# Patient Record
Sex: MALE | Race: WHITE | NOT HISPANIC OR LATINO | Employment: FULL TIME | URBAN - METROPOLITAN AREA
[De-identification: names, ages, dates, MRNs, and addresses within clinical notes are randomized per-mention and may not be internally consistent; named-entity substitution may affect disease eponyms.]

---

## 2017-02-28 ENCOUNTER — APPOINTMENT (OUTPATIENT)
Dept: LAB | Facility: MEDICAL CENTER | Age: 32
End: 2017-02-28

## 2017-03-01 ENCOUNTER — HOSPITAL ENCOUNTER (OUTPATIENT)
Dept: RADIOLOGY | Facility: MEDICAL CENTER | Age: 32
DRG: 472 | End: 2017-03-01
Attending: NEUROLOGICAL SURGERY | Admitting: NEUROLOGICAL SURGERY
Payer: COMMERCIAL

## 2017-03-01 DIAGNOSIS — Z01.812 PRE-PROCEDURAL LABORATORY EXAMINATION: ICD-10-CM

## 2017-03-01 DIAGNOSIS — M50.30 DEGENERATION OF CERVICAL INTERVERTEBRAL DISC: ICD-10-CM

## 2017-03-01 DIAGNOSIS — Z01.810 PRE-OPERATIVE CARDIOVASCULAR EXAMINATION: ICD-10-CM

## 2017-03-01 LAB
ANION GAP SERPL CALC-SCNC: 8 MMOL/L (ref 0–11.9)
APPEARANCE UR: CLEAR
APTT PPP: 27.7 SEC (ref 24.7–36)
BACTERIA #/AREA URNS HPF: ABNORMAL /HPF
BASOPHILS # BLD AUTO: 0.4 % (ref 0–1.8)
BASOPHILS # BLD: 0.02 K/UL (ref 0–0.12)
BILIRUB UR QL STRIP.AUTO: NEGATIVE
BUN SERPL-MCNC: 12 MG/DL (ref 8–22)
CALCIUM SERPL-MCNC: 9.6 MG/DL (ref 8.5–10.5)
CAOX CRY #/AREA URNS HPF: ABNORMAL /HPF
CHLORIDE SERPL-SCNC: 105 MMOL/L (ref 96–112)
CO2 SERPL-SCNC: 28 MMOL/L (ref 20–33)
COLOR UR: YELLOW
CREAT SERPL-MCNC: 1.07 MG/DL (ref 0.5–1.4)
CULTURE IF INDICATED INDCX: NO UA CULTURE
EKG IMPRESSION: NORMAL
EOSINOPHIL # BLD AUTO: 0.06 K/UL (ref 0–0.51)
EOSINOPHIL NFR BLD: 1.2 % (ref 0–6.9)
ERYTHROCYTE [DISTWIDTH] IN BLOOD BY AUTOMATED COUNT: 40 FL (ref 35.9–50)
GFR SERPL CREATININE-BSD FRML MDRD: >60 ML/MIN/1.73 M 2
GLUCOSE SERPL-MCNC: 83 MG/DL (ref 65–99)
GLUCOSE UR STRIP.AUTO-MCNC: NEGATIVE MG/DL
HCT VFR BLD AUTO: 46.5 % (ref 42–52)
HGB BLD-MCNC: 15.4 G/DL (ref 14–18)
HYALINE CASTS #/AREA URNS LPF: ABNORMAL /LPF
IMM GRANULOCYTES # BLD AUTO: 0.05 K/UL (ref 0–0.11)
IMM GRANULOCYTES NFR BLD AUTO: 1 % (ref 0–0.9)
INR PPP: 0.92 (ref 0.87–1.13)
KETONES UR STRIP.AUTO-MCNC: ABNORMAL MG/DL
LEUKOCYTE ESTERASE UR QL STRIP.AUTO: NEGATIVE
LYMPHOCYTES # BLD AUTO: 1.99 K/UL (ref 1–4.8)
LYMPHOCYTES NFR BLD: 39.3 % (ref 22–41)
MCH RBC QN AUTO: 29.5 PG (ref 27–33)
MCHC RBC AUTO-ENTMCNC: 33.1 G/DL (ref 33.7–35.3)
MCV RBC AUTO: 89.1 FL (ref 81.4–97.8)
MICRO URNS: ABNORMAL
MONOCYTES # BLD AUTO: 0.47 K/UL (ref 0–0.85)
MONOCYTES NFR BLD AUTO: 9.3 % (ref 0–13.4)
MUCOUS THREADS #/AREA URNS HPF: ABNORMAL /HPF
NEUTROPHILS # BLD AUTO: 2.48 K/UL (ref 1.82–7.42)
NEUTROPHILS NFR BLD: 48.8 % (ref 44–72)
NITRITE UR QL STRIP.AUTO: NEGATIVE
NRBC # BLD AUTO: 0 K/UL
NRBC BLD AUTO-RTO: 0 /100 WBC
PH UR STRIP.AUTO: 6.5 [PH]
PLATELET # BLD AUTO: 163 K/UL (ref 164–446)
PMV BLD AUTO: 11.2 FL (ref 9–12.9)
POTASSIUM SERPL-SCNC: 3.8 MMOL/L (ref 3.6–5.5)
PROT UR QL STRIP: 30 MG/DL
PROTHROMBIN TIME: 12.6 SEC (ref 12–14.6)
RBC # BLD AUTO: 5.22 M/UL (ref 4.7–6.1)
RBC UR QL AUTO: NEGATIVE
SODIUM SERPL-SCNC: 141 MMOL/L (ref 135–145)
SP GR UR STRIP.AUTO: 1.03
WBC # BLD AUTO: 5.1 K/UL (ref 4.8–10.8)
WBC #/AREA URNS HPF: ABNORMAL /HPF

## 2017-03-01 PROCEDURE — 36415 COLL VENOUS BLD VENIPUNCTURE: CPT

## 2017-03-01 PROCEDURE — 71020 DX-CHEST-2 VIEWS: CPT

## 2017-03-01 PROCEDURE — 81001 URINALYSIS AUTO W/SCOPE: CPT

## 2017-03-01 PROCEDURE — 80048 BASIC METABOLIC PNL TOTAL CA: CPT

## 2017-03-01 PROCEDURE — 85025 COMPLETE CBC W/AUTO DIFF WBC: CPT

## 2017-03-01 PROCEDURE — 85730 THROMBOPLASTIN TIME PARTIAL: CPT

## 2017-03-01 PROCEDURE — 72050 X-RAY EXAM NECK SPINE 4/5VWS: CPT

## 2017-03-01 PROCEDURE — 85610 PROTHROMBIN TIME: CPT

## 2017-03-01 RX ORDER — LIDOCAINE 50 MG/G
1 PATCH TOPICAL
COMMUNITY

## 2017-03-01 RX ORDER — CYCLOBENZAPRINE HCL 10 MG
10 TABLET ORAL
Status: ON HOLD | COMMUNITY
End: 2017-03-08

## 2017-03-01 RX ORDER — PREGABALIN 50 MG/1
50 CAPSULE ORAL 2 TIMES DAILY
COMMUNITY
End: 2018-01-25

## 2017-03-07 ENCOUNTER — HOSPITAL ENCOUNTER (INPATIENT)
Facility: MEDICAL CENTER | Age: 32
LOS: 1 days | DRG: 472 | End: 2017-03-08
Attending: NEUROLOGICAL SURGERY | Admitting: NEUROLOGICAL SURGERY
Payer: COMMERCIAL

## 2017-03-07 ENCOUNTER — APPOINTMENT (OUTPATIENT)
Dept: RADIOLOGY | Facility: MEDICAL CENTER | Age: 32
DRG: 472 | End: 2017-03-07
Attending: NEUROLOGICAL SURGERY
Payer: COMMERCIAL

## 2017-03-07 PROBLEM — M50.30 DEGENERATION OF CERVICAL INTERVERTEBRAL DISC: Status: ACTIVE | Noted: 2017-03-07

## 2017-03-07 PROCEDURE — 700111 HCHG RX REV CODE 636 W/ 250 OVERRIDE (IP)

## 2017-03-07 PROCEDURE — A6402 STERILE GAUZE <= 16 SQ IN: HCPCS | Performed by: NEUROLOGICAL SURGERY

## 2017-03-07 PROCEDURE — 700101 HCHG RX REV CODE 250

## 2017-03-07 PROCEDURE — 770001 HCHG ROOM/CARE - MED/SURG/GYN PRIV*

## 2017-03-07 PROCEDURE — A9270 NON-COVERED ITEM OR SERVICE: HCPCS | Performed by: PHYSICIAN ASSISTANT

## 2017-03-07 PROCEDURE — 160048 HCHG OR STATISTICAL LEVEL 1-5: Performed by: NEUROLOGICAL SURGERY

## 2017-03-07 PROCEDURE — 500698 HCHG HEMOCLIP, MEDIUM: Performed by: NEUROLOGICAL SURGERY

## 2017-03-07 PROCEDURE — 160041 HCHG SURGERY MINUTES - EA ADDL 1 MIN LEVEL 4: Performed by: NEUROLOGICAL SURGERY

## 2017-03-07 PROCEDURE — A9270 NON-COVERED ITEM OR SERVICE: HCPCS

## 2017-03-07 PROCEDURE — 700101 HCHG RX REV CODE 250: Performed by: PHYSICIAN ASSISTANT

## 2017-03-07 PROCEDURE — 500122 HCHG BOVIE, BLADE: Performed by: NEUROLOGICAL SURGERY

## 2017-03-07 PROCEDURE — 110382 HCHG SHELL REV 271: Performed by: NEUROLOGICAL SURGERY

## 2017-03-07 PROCEDURE — 700102 HCHG RX REV CODE 250 W/ 637 OVERRIDE(OP): Performed by: PHYSICIAN ASSISTANT

## 2017-03-07 PROCEDURE — 0RG10A0 FUSION OF CERVICAL VERTEBRAL JOINT WITH INTERBODY FUSION DEVICE, ANTERIOR APPROACH, ANTERIOR COLUMN, OPEN APPROACH: ICD-10-PCS | Performed by: NEUROLOGICAL SURGERY

## 2017-03-07 PROCEDURE — 500864 HCHG NEEDLE, SPINAL 18G: Performed by: NEUROLOGICAL SURGERY

## 2017-03-07 PROCEDURE — 700102 HCHG RX REV CODE 250 W/ 637 OVERRIDE(OP)

## 2017-03-07 PROCEDURE — A4606 OXYGEN PROBE USED W OXIMETER: HCPCS | Performed by: NEUROLOGICAL SURGERY

## 2017-03-07 PROCEDURE — 500700 HCHG HEMOCLIP, SMALL (RED): Performed by: NEUROLOGICAL SURGERY

## 2017-03-07 PROCEDURE — 502626 HCHG SURGIFLO HEMOSTATIC MATRIX 6ML: Performed by: NEUROLOGICAL SURGERY

## 2017-03-07 PROCEDURE — C1713 ANCHOR/SCREW BN/BN,TIS/BN: HCPCS | Performed by: NEUROLOGICAL SURGERY

## 2017-03-07 PROCEDURE — 0RT30ZZ RESECTION OF CERVICAL VERTEBRAL DISC, OPEN APPROACH: ICD-10-PCS | Performed by: NEUROLOGICAL SURGERY

## 2017-03-07 PROCEDURE — 160035 HCHG PACU - 1ST 60 MINS PHASE I: Performed by: NEUROLOGICAL SURGERY

## 2017-03-07 PROCEDURE — 501838 HCHG SUTURE GENERAL: Performed by: NEUROLOGICAL SURGERY

## 2017-03-07 PROCEDURE — 160009 HCHG ANES TIME/MIN: Performed by: NEUROLOGICAL SURGERY

## 2017-03-07 PROCEDURE — 502240 HCHG MISC OR SUPPLY RC 0272: Performed by: NEUROLOGICAL SURGERY

## 2017-03-07 PROCEDURE — 502000 HCHG MISC OR IMPLANTS RC 0278: Performed by: NEUROLOGICAL SURGERY

## 2017-03-07 PROCEDURE — 700111 HCHG RX REV CODE 636 W/ 250 OVERRIDE (IP): Performed by: PHYSICIAN ASSISTANT

## 2017-03-07 PROCEDURE — 72040 X-RAY EXAM NECK SPINE 2-3 VW: CPT

## 2017-03-07 PROCEDURE — 700112 HCHG RX REV CODE 229: Performed by: PHYSICIAN ASSISTANT

## 2017-03-07 PROCEDURE — 4A11X4G MONITORING OF PERIPHERAL NERVOUS ELECTRICAL ACTIVITY, INTRAOPERATIVE, EXTERNAL APPROACH: ICD-10-PCS | Performed by: NEUROLOGICAL SURGERY

## 2017-03-07 PROCEDURE — 160029 HCHG SURGERY MINUTES - 1ST 30 MINS LEVEL 4: Performed by: NEUROLOGICAL SURGERY

## 2017-03-07 PROCEDURE — 160002 HCHG RECOVERY MINUTES (STAT): Performed by: NEUROLOGICAL SURGERY

## 2017-03-07 PROCEDURE — 500364 HCHG DISSECT TOOL, MIDAS: Performed by: NEUROLOGICAL SURGERY

## 2017-03-07 PROCEDURE — 160036 HCHG PACU - EA ADDL 30 MINS PHASE I: Performed by: NEUROLOGICAL SURGERY

## 2017-03-07 DEVICE — GRAFT ACTIFUSE ABX PUTTY 1.5ML: Type: IMPLANTABLE DEVICE | Status: FUNCTIONAL

## 2017-03-07 RX ORDER — AMOXICILLIN 250 MG
1 CAPSULE ORAL NIGHTLY
Status: DISCONTINUED | OUTPATIENT
Start: 2017-03-07 | End: 2017-03-08 | Stop reason: HOSPADM

## 2017-03-07 RX ORDER — PROMETHAZINE HYDROCHLORIDE 25 MG/1
12.5-25 TABLET ORAL EVERY 4 HOURS PRN
Status: DISCONTINUED | OUTPATIENT
Start: 2017-03-07 | End: 2017-03-08 | Stop reason: HOSPADM

## 2017-03-07 RX ORDER — DIVALPROEX SODIUM 500 MG/1
1000 TABLET, DELAYED RELEASE ORAL
Status: DISCONTINUED | OUTPATIENT
Start: 2017-03-07 | End: 2017-03-08 | Stop reason: HOSPADM

## 2017-03-07 RX ORDER — DEXTROSE MONOHYDRATE, SODIUM CHLORIDE, AND POTASSIUM CHLORIDE 50; 1.49; 4.5 G/1000ML; G/1000ML; G/1000ML
INJECTION, SOLUTION INTRAVENOUS CONTINUOUS
Status: DISCONTINUED | OUTPATIENT
Start: 2017-03-07 | End: 2017-03-08 | Stop reason: HOSPADM

## 2017-03-07 RX ORDER — OXYCODONE HYDROCHLORIDE 10 MG/1
10 TABLET ORAL EVERY 4 HOURS PRN
Status: DISCONTINUED | OUTPATIENT
Start: 2017-03-07 | End: 2017-03-08 | Stop reason: HOSPADM

## 2017-03-07 RX ORDER — DIVALPROEX SODIUM 500 MG/1
1000 TABLET, DELAYED RELEASE ORAL
COMMUNITY

## 2017-03-07 RX ORDER — DIPHENHYDRAMINE HCL 25 MG
25 TABLET ORAL EVERY 6 HOURS PRN
Status: DISCONTINUED | OUTPATIENT
Start: 2017-03-07 | End: 2017-03-08 | Stop reason: HOSPADM

## 2017-03-07 RX ORDER — OXYCODONE HYDROCHLORIDE 5 MG/1
10-20 TABLET ORAL EVERY 4 HOURS PRN
Status: DISCONTINUED | OUTPATIENT
Start: 2017-03-07 | End: 2017-03-07

## 2017-03-07 RX ORDER — ALPRAZOLAM 0.25 MG/1
0.25 TABLET ORAL 2 TIMES DAILY PRN
Status: DISCONTINUED | OUTPATIENT
Start: 2017-03-07 | End: 2017-03-08 | Stop reason: HOSPADM

## 2017-03-07 RX ORDER — LIDOCAINE HYDROCHLORIDE 10 MG/ML
INJECTION, SOLUTION INFILTRATION; PERINEURAL
Status: COMPLETED
Start: 2017-03-07 | End: 2017-03-07

## 2017-03-07 RX ORDER — SODIUM CHLORIDE, SODIUM LACTATE, POTASSIUM CHLORIDE, AND CALCIUM CHLORIDE .6; .31; .03; .02 G/100ML; G/100ML; G/100ML; G/100ML
IRRIGANT IRRIGATION
Status: DISCONTINUED | OUTPATIENT
Start: 2017-03-07 | End: 2017-03-07 | Stop reason: HOSPADM

## 2017-03-07 RX ORDER — ACETAMINOPHEN 500 MG
TABLET ORAL
Status: COMPLETED
Start: 2017-03-07 | End: 2017-03-07

## 2017-03-07 RX ORDER — ALBUTEROL SULFATE 90 UG/1
2 AEROSOL, METERED RESPIRATORY (INHALATION) EVERY 4 HOURS PRN
Status: DISCONTINUED | OUTPATIENT
Start: 2017-03-07 | End: 2017-03-08 | Stop reason: HOSPADM

## 2017-03-07 RX ORDER — METHYLPREDNISOLONE ACETATE 80 MG/ML
80 INJECTION, SUSPENSION INTRA-ARTICULAR; INTRALESIONAL; INTRAMUSCULAR; SOFT TISSUE ONCE
Status: COMPLETED | OUTPATIENT
Start: 2017-03-07 | End: 2017-03-07

## 2017-03-07 RX ORDER — OXYCODONE HYDROCHLORIDE 5 MG/1
5-10 TABLET ORAL EVERY 4 HOURS PRN
Status: DISCONTINUED | OUTPATIENT
Start: 2017-03-07 | End: 2017-03-07

## 2017-03-07 RX ORDER — MORPHINE SULFATE 4 MG/ML
2-4 INJECTION, SOLUTION INTRAMUSCULAR; INTRAVENOUS
Status: DISCONTINUED | OUTPATIENT
Start: 2017-03-07 | End: 2017-03-08 | Stop reason: HOSPADM

## 2017-03-07 RX ORDER — OXYCODONE HCL 5 MG/5 ML
SOLUTION, ORAL ORAL
Status: COMPLETED
Start: 2017-03-07 | End: 2017-03-07

## 2017-03-07 RX ORDER — PREGABALIN 25 MG/1
50 CAPSULE ORAL 2 TIMES DAILY
Status: DISCONTINUED | OUTPATIENT
Start: 2017-03-07 | End: 2017-03-08 | Stop reason: HOSPADM

## 2017-03-07 RX ORDER — CEPHALEXIN 500 MG/1
500 CAPSULE ORAL EVERY 6 HOURS
Status: DISCONTINUED | OUTPATIENT
Start: 2017-03-08 | End: 2017-03-08 | Stop reason: HOSPADM

## 2017-03-07 RX ORDER — ONDANSETRON 4 MG/1
4 TABLET, ORALLY DISINTEGRATING ORAL EVERY 4 HOURS PRN
Status: DISCONTINUED | OUTPATIENT
Start: 2017-03-07 | End: 2017-03-08 | Stop reason: HOSPADM

## 2017-03-07 RX ORDER — GABAPENTIN 300 MG/1
CAPSULE ORAL
Status: COMPLETED
Start: 2017-03-07 | End: 2017-03-07

## 2017-03-07 RX ORDER — DOCUSATE SODIUM 100 MG/1
100 CAPSULE, LIQUID FILLED ORAL 2 TIMES DAILY
Status: DISCONTINUED | OUTPATIENT
Start: 2017-03-07 | End: 2017-03-08 | Stop reason: HOSPADM

## 2017-03-07 RX ORDER — OXYCODONE HCL 20 MG/1
TABLET, FILM COATED, EXTENDED RELEASE ORAL
Status: COMPLETED
Start: 2017-03-07 | End: 2017-03-07

## 2017-03-07 RX ORDER — OXYCODONE HYDROCHLORIDE 10 MG/1
20 TABLET ORAL EVERY 4 HOURS PRN
Status: DISCONTINUED | OUTPATIENT
Start: 2017-03-07 | End: 2017-03-08 | Stop reason: HOSPADM

## 2017-03-07 RX ORDER — LABETALOL HYDROCHLORIDE 5 MG/ML
10 INJECTION, SOLUTION INTRAVENOUS
Status: DISCONTINUED | OUTPATIENT
Start: 2017-03-07 | End: 2017-03-08 | Stop reason: HOSPADM

## 2017-03-07 RX ORDER — CEFAZOLIN SODIUM 2 G/100ML
2 INJECTION, SOLUTION INTRAVENOUS EVERY 8 HOURS
Status: COMPLETED | OUTPATIENT
Start: 2017-03-07 | End: 2017-03-08

## 2017-03-07 RX ORDER — AMOXICILLIN 250 MG
1 CAPSULE ORAL
Status: DISCONTINUED | OUTPATIENT
Start: 2017-03-07 | End: 2017-03-08 | Stop reason: HOSPADM

## 2017-03-07 RX ORDER — DIPHENHYDRAMINE HYDROCHLORIDE 50 MG/ML
25 INJECTION INTRAMUSCULAR; INTRAVENOUS EVERY 6 HOURS PRN
Status: DISCONTINUED | OUTPATIENT
Start: 2017-03-07 | End: 2017-03-08 | Stop reason: HOSPADM

## 2017-03-07 RX ORDER — SODIUM CHLORIDE, SODIUM LACTATE, POTASSIUM CHLORIDE, CALCIUM CHLORIDE 600; 310; 30; 20 MG/100ML; MG/100ML; MG/100ML; MG/100ML
1000 INJECTION, SOLUTION INTRAVENOUS
Status: COMPLETED | OUTPATIENT
Start: 2017-03-07 | End: 2017-03-07

## 2017-03-07 RX ORDER — BUPIVACAINE HYDROCHLORIDE AND EPINEPHRINE 5; 5 MG/ML; UG/ML
INJECTION, SOLUTION EPIDURAL; INTRACAUDAL; PERINEURAL
Status: DISCONTINUED | OUTPATIENT
Start: 2017-03-07 | End: 2017-03-07 | Stop reason: HOSPADM

## 2017-03-07 RX ORDER — HYDRALAZINE HYDROCHLORIDE 20 MG/ML
10 INJECTION INTRAMUSCULAR; INTRAVENOUS
Status: DISCONTINUED | OUTPATIENT
Start: 2017-03-07 | End: 2017-03-08 | Stop reason: HOSPADM

## 2017-03-07 RX ORDER — ONDANSETRON 2 MG/ML
4 INJECTION INTRAMUSCULAR; INTRAVENOUS EVERY 4 HOURS PRN
Status: DISCONTINUED | OUTPATIENT
Start: 2017-03-07 | End: 2017-03-08 | Stop reason: HOSPADM

## 2017-03-07 RX ORDER — BISACODYL 10 MG
10 SUPPOSITORY, RECTAL RECTAL
Status: DISCONTINUED | OUTPATIENT
Start: 2017-03-07 | End: 2017-03-08 | Stop reason: HOSPADM

## 2017-03-07 RX ORDER — POLYETHYLENE GLYCOL 3350 17 G/17G
1 POWDER, FOR SOLUTION ORAL 2 TIMES DAILY PRN
Status: DISCONTINUED | OUTPATIENT
Start: 2017-03-07 | End: 2017-03-08 | Stop reason: HOSPADM

## 2017-03-07 RX ORDER — ACETAMINOPHEN 500 MG
500 TABLET ORAL EVERY 6 HOURS
Status: DISCONTINUED | OUTPATIENT
Start: 2017-03-08 | End: 2017-03-08 | Stop reason: HOSPADM

## 2017-03-07 RX ORDER — PROMETHAZINE HYDROCHLORIDE 25 MG/1
12.5-25 SUPPOSITORY RECTAL EVERY 4 HOURS PRN
Status: DISCONTINUED | OUTPATIENT
Start: 2017-03-07 | End: 2017-03-08 | Stop reason: HOSPADM

## 2017-03-07 RX ORDER — ENEMA 19; 7 G/133ML; G/133ML
1 ENEMA RECTAL
Status: DISCONTINUED | OUTPATIENT
Start: 2017-03-07 | End: 2017-03-08 | Stop reason: HOSPADM

## 2017-03-07 RX ORDER — OXYCODONE HYDROCHLORIDE 5 MG/1
5 TABLET ORAL EVERY 4 HOURS PRN
Status: DISCONTINUED | OUTPATIENT
Start: 2017-03-07 | End: 2017-03-08 | Stop reason: HOSPADM

## 2017-03-07 RX ORDER — OXYCODONE HYDROCHLORIDE 5 MG/1
10 TABLET ORAL EVERY 6 HOURS PRN
Status: ON HOLD | COMMUNITY
End: 2017-03-08

## 2017-03-07 RX ORDER — ALPRAZOLAM 0.25 MG/1
TABLET ORAL
Status: DISPENSED
Start: 2017-03-07 | End: 2017-03-08

## 2017-03-07 RX ORDER — CALCIUM CARBONATE 500 MG/1
500 TABLET, CHEWABLE ORAL 2 TIMES DAILY
Status: DISCONTINUED | OUTPATIENT
Start: 2017-03-07 | End: 2017-03-08 | Stop reason: HOSPADM

## 2017-03-07 RX ORDER — ALBUTEROL SULFATE 2.5 MG/3ML
SOLUTION RESPIRATORY (INHALATION)
Status: COMPLETED
Start: 2017-03-07 | End: 2017-03-07

## 2017-03-07 RX ORDER — CYCLOBENZAPRINE HCL 10 MG
10 TABLET ORAL EVERY 8 HOURS PRN
Status: DISCONTINUED | OUTPATIENT
Start: 2017-03-07 | End: 2017-03-08 | Stop reason: HOSPADM

## 2017-03-07 RX ADMIN — DIVALPROEX SODIUM 1000 MG: 500 TABLET, DELAYED RELEASE ORAL at 22:47

## 2017-03-07 RX ADMIN — HYDROMORPHONE HYDROCHLORIDE 0.5 MG: 1 INJECTION, SOLUTION INTRAMUSCULAR; INTRAVENOUS; SUBCUTANEOUS at 17:20

## 2017-03-07 RX ADMIN — OXYCODONE HYDROCHLORIDE 20 MG: 20 TABLET, FILM COATED, EXTENDED RELEASE ORAL at 14:10

## 2017-03-07 RX ADMIN — HYDROMORPHONE HYDROCHLORIDE 0.5 MG: 1 INJECTION, SOLUTION INTRAMUSCULAR; INTRAVENOUS; SUBCUTANEOUS at 20:50

## 2017-03-07 RX ADMIN — FENTANYL CITRATE 50 MCG: 50 INJECTION, SOLUTION INTRAMUSCULAR; INTRAVENOUS at 17:10

## 2017-03-07 RX ADMIN — LIDOCAINE HYDROCHLORIDE: 10 INJECTION, SOLUTION INFILTRATION; PERINEURAL at 13:40

## 2017-03-07 RX ADMIN — CYCLOBENZAPRINE HYDROCHLORIDE 10 MG: 10 TABLET, FILM COATED ORAL at 23:37

## 2017-03-07 RX ADMIN — ALPRAZOLAM 0.25 MG: 0.25 TABLET ORAL at 17:40

## 2017-03-07 RX ADMIN — HYDROMORPHONE HYDROCHLORIDE 0.5 MG: 1 INJECTION, SOLUTION INTRAMUSCULAR; INTRAVENOUS; SUBCUTANEOUS at 17:30

## 2017-03-07 RX ADMIN — METHYLPREDNISOLONE ACETATE 80 MG: 80 INJECTION, SUSPENSION INTRA-ARTICULAR; INTRALESIONAL; INTRAMUSCULAR; SOFT TISSUE at 23:17

## 2017-03-07 RX ADMIN — OXYCODONE HYDROCHLORIDE 20 MG: 10 TABLET ORAL at 22:48

## 2017-03-07 RX ADMIN — HYDROMORPHONE HYDROCHLORIDE 0.5 MG: 1 INJECTION, SOLUTION INTRAMUSCULAR; INTRAVENOUS; SUBCUTANEOUS at 19:15

## 2017-03-07 RX ADMIN — PREGABALIN 50 MG: 25 CAPSULE ORAL at 22:48

## 2017-03-07 RX ADMIN — DOCUSATE SODIUM 100 MG: 100 CAPSULE ORAL at 22:47

## 2017-03-07 RX ADMIN — POTASSIUM CHLORIDE, DEXTROSE MONOHYDRATE AND SODIUM CHLORIDE: 150; 5; 450 INJECTION, SOLUTION INTRAVENOUS at 23:16

## 2017-03-07 RX ADMIN — HYDROMORPHONE HYDROCHLORIDE 0.5 MG: 1 INJECTION, SOLUTION INTRAMUSCULAR; INTRAVENOUS; SUBCUTANEOUS at 17:50

## 2017-03-07 RX ADMIN — STANDARDIZED SENNA CONCENTRATE AND DOCUSATE SODIUM 1 TABLET: 8.6; 5 TABLET, FILM COATED ORAL at 22:47

## 2017-03-07 RX ADMIN — HYDROMORPHONE HYDROCHLORIDE 0.5 MG: 1 INJECTION, SOLUTION INTRAMUSCULAR; INTRAVENOUS; SUBCUTANEOUS at 18:45

## 2017-03-07 RX ADMIN — CEFAZOLIN SODIUM 2 G: 2 INJECTION, SOLUTION INTRAVENOUS at 23:16

## 2017-03-07 RX ADMIN — GABAPENTIN 600 MG: 300 CAPSULE ORAL at 14:10

## 2017-03-07 RX ADMIN — ALBUTEROL SULFATE 2.5 MG: 2.5 SOLUTION RESPIRATORY (INHALATION) at 18:25

## 2017-03-07 RX ADMIN — SODIUM CHLORIDE, SODIUM LACTATE, POTASSIUM CHLORIDE, CALCIUM CHLORIDE 1000 ML: 600; 310; 30; 20 INJECTION, SOLUTION INTRAVENOUS at 13:50

## 2017-03-07 RX ADMIN — HYDROMORPHONE HYDROCHLORIDE 0.5 MG: 1 INJECTION, SOLUTION INTRAMUSCULAR; INTRAVENOUS; SUBCUTANEOUS at 20:35

## 2017-03-07 RX ADMIN — HYDROMORPHONE HYDROCHLORIDE 0.5 MG: 1 INJECTION, SOLUTION INTRAMUSCULAR; INTRAVENOUS; SUBCUTANEOUS at 17:40

## 2017-03-07 RX ADMIN — OXYCODONE HYDROCHLORIDE 10 MG: 5 SOLUTION ORAL at 16:35

## 2017-03-07 RX ADMIN — FENTANYL CITRATE 50 MCG: 50 INJECTION, SOLUTION INTRAMUSCULAR; INTRAVENOUS at 17:02

## 2017-03-07 RX ADMIN — ANTACID TABLETS 500 MG: 500 TABLET, CHEWABLE ORAL at 22:48

## 2017-03-07 RX ADMIN — ACETAMINOPHEN 1000 MG: 500 TABLET, FILM COATED ORAL at 14:10

## 2017-03-07 ASSESSMENT — LIFESTYLE VARIABLES
TOTAL SCORE: 0
AVERAGE NUMBER OF DAYS PER WEEK YOU HAVE A DRINK CONTAINING ALCOHOL: 1
EVER FELT BAD OR GUILTY ABOUT YOUR DRINKING: NO
HAVE YOU EVER FELT YOU SHOULD CUT DOWN ON YOUR DRINKING: NO
HOW MANY TIMES IN THE PAST YEAR HAVE YOU HAD 5 OR MORE DRINKS IN A DAY: 0
TOTAL SCORE: 0
EVER HAD A DRINK FIRST THING IN THE MORNING TO STEADY YOUR NERVES TO GET RID OF A HANGOVER: NO
TOTAL SCORE: 0
HAVE PEOPLE ANNOYED YOU BY CRITICIZING YOUR DRINKING: NO
EVER_SMOKED: YES
CONSUMPTION TOTAL: NEGATIVE
ALCOHOL_USE: YES
ON A TYPICAL DAY WHEN YOU DRINK ALCOHOL HOW MANY DRINKS DO YOU HAVE: 0

## 2017-03-07 ASSESSMENT — PAIN SCALES - GENERAL
PAINLEVEL_OUTOF10: 0
PAINLEVEL_OUTOF10: 8
PAINLEVEL_OUTOF10: 6
PAINLEVEL_OUTOF10: 0
PAINLEVEL_OUTOF10: 6
PAINLEVEL_OUTOF10: 7
PAINLEVEL_OUTOF10: 7
PAINLEVEL_OUTOF10: 6
PAINLEVEL_OUTOF10: 7
PAINLEVEL_OUTOF10: 6
PAINLEVEL_OUTOF10: 6
PAINLEVEL_OUTOF10: 8
PAINLEVEL_OUTOF10: 8
PAINLEVEL_OUTOF10: 6
PAINLEVEL_OUTOF10: 6
PAINLEVEL_OUTOF10: 0
PAINLEVEL_OUTOF10: 6

## 2017-03-07 NOTE — PROGRESS NOTES
Med rec partially complete [facility] pt and [facility]  [phone]  Allergies reviewed  [facility] verified Divalproex though Pt only takes 2 tabs at night(1000 mg ) instead of 4 tabs(2000 mg).

## 2017-03-07 NOTE — IP AVS SNAPSHOT
3/8/2017          Danie Pretty  [address] [facility] [address]    Dear Danie:    [facility] wants to ensure your discharge home is safe and you or your loved ones have had all your questions answered regarding your care after you leave the hospital.    You may receive a telephone call within two days of your discharge.  This call is to make certain you understand your discharge instructions as well as ensure we provided you with the best care possible during your stay with us.     The call will only last approximately 3-5 minutes and will be done by a nurse.    Once again, we want to ensure your discharge home is safe and that you have a clear understanding of any next steps in your care.  If you have any questions or concerns, please do not hesitate to contact us, we are here for you.  Thank you for choosing [facility] for your healthcare needs.    Sincerely,    Josr Lawson    [facility]

## 2017-03-07 NOTE — IP AVS SNAPSHOT
" <p align=\"LEFT\"><IMG SRC=\"[email or web address]\" alt=\"Image\" WIDTH=\"50%\" HEIGHT=\"200\" BORDER=\"\"></p>                   Name:Danie Pretty  Medical Record Number:[id number]  CSN: [id number]    YOB: 1985   Age: 32 y.o.  Sex: male  HT:1.905 m (6' 3\") WT: 119.7 kg (263 lb 14.3 oz)          Admit Date: 3/7/2017     Discharge Date:   Today's Date: 3/8/2017  Attending Doctor:  Timi Cook M.D.                  Allergies:  Review of patient's allergies indicates no known allergies.             Medication List      Take these Medications        Instructions    cephALEXin 500 MG Caps   Commonly known as:  KEFLEX    Take 1 Cap by mouth every 6 hours for 5 days.   Dose:  500 mg       cyclobenzaprine 10 MG Tabs   Commonly known as:  FLEXERIL    Take 1 Tab by mouth every 8 hours as needed for Muscle Spasms.   Dose:  10 mg       divalproex 500 MG Tbec   Commonly known as:  DEPAKOTE    Take 1,000 mg by mouth every bedtime.   Dose:  1000 mg       lidocaine 5 % Ptch   Commonly known as:  LIDODERM    Apply 1 Patch to skin as directed 1 time daily as needed.   Dose:  1 Patch       LYRICA 50 MG capsule   Generic drug:  pregabalin    Take 50 mg by mouth 2 times a day.   Dose:  50 mg       oxycodone immediate release 10 MG immediate release tablet   What changed:    - medication strength  - how much to take  - when to take this  - reasons to take this   Commonly known as:  ROXICODONE    Take 1-2 Tabs by mouth every four hours as needed for Severe Pain (NRS pain scale 7-10, CPOT pain scale 6-8).   Dose:  10-20 mg         "

## 2017-03-07 NOTE — IP AVS SNAPSHOT
All Access Telecom Access Code: Activation code not generated  Current All Access Telecom Status: Patient Declined    [email or web address]  A secure, online tool to manage your health information     [employer]’s All Access Telecom® is a secure, online tool that connects you to your personalized health information from the privacy of your home -- day or night - making it very easy for you to manage your healthcare. Once the activation process is completed, you can even access your medical information using the All Access Telecom dao, which is available for free in the Apple Dao store or Google Play store.     All Access Telecom provides the following levels of access (as shown below):   My Chart Features   [facility] Primary Care Doctor [facility]  Specialists [facility]  Urgent  Care Non-[facility]  Primary Care  Doctor   Email your healthcare team securely and privately 24/7 X X X X   Manage appointments: schedule your next appointment; view details of past/upcoming appointments X      Request prescription refills. X      View recent personal medical records, including lab and immunizations X X X X   View health record, including health history, allergies, medications X X X X   Read reports about your outpatient visits, procedures, consult and ER notes X X X X   See your discharge summary, which is a recap of your hospital and/or ER visit that includes your diagnosis, lab results, and care plan. X X       How to register for All Access Telecom:  1. Go to  [email or web address].  2. Click on the Sign Up Now box, which takes you to the New Member Sign Up page. You will need to provide the following information:  a. Enter your All Access Telecom Access Code exactly as it appears at the top of this page. (You will not need to use this code after you’ve completed the sign-up process. If you do not sign up before the expiration date, you must request a new code.)   b. Enter your date of birth.   c. Enter your home email address.   d. Click Submit, and follow the next screen’s instructions.  3. Create a All Access Telecom ID.  This will be your [employer] login ID and cannot be changed, so think of one that is secure and easy to remember.  4. Create a [employer] password. You can change your password at any time.  5. Enter your Password Reset Question and Answer. This can be used at a later time if you forget your password.   6. Enter your e-mail address. This allows you to receive e-mail notifications when new information is available in [employer].  7. Click Sign Up. You can now view your health information.    For assistance activating your [employer] account, call [phone]

## 2017-03-07 NOTE — IP AVS SNAPSHOT
" Home Care Instructions                                                                                                                  Name:Danie Pretty  Medical Record Number:[id number]  CSN: [id number]    YOB: 1985   Age: 32 y.o.  Sex: male  HT:1.905 m (6' 3\") WT: 119.7 kg (263 lb 14.3 oz)          Admit Date: 3/7/2017     Discharge Date:   Today's Date: 3/8/2017  Attending Doctor:  Timi Cook M.D.                  Allergies:  Review of patient's allergies indicates no known allergies.            Discharge Instructions       Discharge Instructions    Discharged to home by car with relative. Discharged via wheelchair, hospital escort: Yes.  Special equipment needed: Not Applicable    Be sure to schedule a follow-up appointment with your primary care doctor or any specialists as instructed.     Discharge Plan:   Influenza Vaccine Indication: Not indicated: Previously immunized this influenza season and > 8 years of age    I understand that a diet low in cholesterol, fat, and sodium is recommended for good health. Unless I have been given specific instructions below for another diet, I accept this instruction as my diet prescription.   Other diet: Regular    Special Instructions: NO repetitive arms above shoulder level, NO pushing, pulling or lifting greater than 15 lbs , shower ok 24 hours after drain removed, pat incision / steri strips dry, no dressing unless drainage, NO non-steroidal anti-inflammatory meds until cleared by MD (for example Motrin, Ibuprofen, Celebrex), call office for incision redness, drainage, chills or increased temperature, ambulate as much as it is comfortable for you and NO driving for at least 2 weeks following surgery.    · Is patient discharged on Warfarin / Coumadin?   No     · Is patient Post Blood Transfusion?  No    Depression / Suicide Risk    As you are discharged from this [facility] facility, it is important to learn how to keep safe from harming " yourself.    Recognize the warning signs:  · Abrupt changes in personality, positive or negative- including increase in energy   · Giving away possessions  · Change in eating patterns- significant weight changes-  positive or negative  · Change in sleeping patterns- unable to sleep or sleeping all the time   · Unwillingness or inability to communicate  · Depression  · Unusual sadness, discouragement and loneliness  · Talk of wanting to die  · Neglect of personal appearance   · Rebelliousness- reckless behavior  · Withdrawal from people/activities they love  · Confusion- inability to concentrate     If you or a loved one observes any of these behaviors or has concerns about self-harm, here's what you can do:  · Talk about it- your feelings and reasons for harming yourself  · Remove any means that you might use to hurt yourself (examples: pills, rope, extension cords, firearm)  · Get professional help from the community (Mental Health, Substance Abuse, psychological counseling)  · Do not be alone:Call your Safe Contact- someone whom you trust who will be there for you.  · Call your local CRISIS HOTLINE 722-5174 or 500-061-2464  · Call your local Children's Mobile Crisis Response Team [place] (305) 256-6520 or www.ResolutionTube  · Call the toll free National Suicide Prevention Hotlines   · National Suicide Prevention Lifeline 881-041-ARHP (8642)  · National Hope Line Network 800-SUICIDE (852-0172)           Discharge Medication Instructions:    Below are the medications your physician expects you to take upon discharge:    Review all your home medications and newly ordered medications with your doctor and/or pharmacist. Follow medication instructions as directed by your doctor and/or pharmacist.    Please keep your medication list with you and share with your physician.               Medication List      START taking these medications        Instructions    cephALEXin 500 MG Caps   Commonly known as:  KEFLEX     Take 1 Cap by mouth every 6 hours for 5 days.   Dose:  500 mg       cyclobenzaprine 10 MG Tabs   Last time this was given:  10 mg on 3/7/2017 11:37 PM   Commonly known as:  FLEXERIL    Take 1 Tab by mouth every 8 hours as needed for Muscle Spasms.   Dose:  10 mg         CHANGE how you take these medications        Instructions    oxycodone immediate release 10 MG immediate release tablet   What changed:    - medication strength  - how much to take  - when to take this  - reasons to take this   Last time this was given:  20 mg on 3/8/2017  7:33 AM   Commonly known as:  ROXICODONE    Take 1-2 Tabs by mouth every four hours as needed for Severe Pain (NRS pain scale 7-10, CPOT pain scale 6-8).   Dose:  10-20 mg         CONTINUE taking these medications        Instructions    divalproex 500 MG Tbec   Last time this was given:  1,000 mg on 3/7/2017 10:47 PM   Commonly known as:  DEPAKOTE    Take 1,000 mg by mouth every bedtime.   Dose:  1000 mg       lidocaine 5 % Ptch   Commonly known as:  LIDODERM    Apply 1 Patch to skin as directed 1 time daily as needed.   Dose:  1 Patch       LYRICA 50 MG capsule   Last time this was given:  50 mg on 3/8/2017  7:33 AM   Generic drug:  pregabalin    Take 50 mg by mouth 2 times a day.   Dose:  50 mg               Instructions           Diet / Nutrition:    Follow any diet instructions given to you by your doctor or the dietician, including how much salt (sodium) you are allowed each day.    If you are overweight, talk to your doctor about a weight reduction plan.    Activity:    Remain physically active following your doctor's instructions about exercise and activity.    Rest often.     Any time you become even a little tired or short of breath, SIT DOWN and rest.    Worsening Symptoms:    Report any of the following signs and symptoms to the doctor's office immediately:    *Pain of jaw, arm, or neck  *Chest pain not relieved by medication                               *Dizziness  or loss of consciousness  *Difficulty breathing even when at rest   *More tired than usual                                       *Bleeding drainage or swelling of surgical site  *Swelling of feet, ankles, legs or stomach                 *Fever (>100ºF)  *Pink or blood tinged sputum  *Weight gain (3lbs/day or 5lbs /week)           *Shock from internal defibrillator (if applicable)  *Palpitations or irregular heartbeats                *Cool and/or numb extremities    Stroke Awareness    Common Risk Factors for Stroke include:    Age  Atrial Fibrillation  Carotid Artery Stenosis  Diabetes Mellitus  Excessive alcohol consumption  High blood pressure  Overweight   Physical inactivity  Smoking    Warning signs and symptoms of a stroke include:    *Sudden numbness or weakness of the face, arm or leg (especially on one side of the body).  *Sudden confusion, trouble speaking or understanding.  *Sudden trouble seeing in one or both eyes.  *Sudden trouble walking, dizziness, loss of balance or coordination.Sudden severe headache with no known cause.    It is very important to get treatment quickly when a stroke occurs. If you experience any of the above warning signs, call 911 immediately.                   Disclaimer         Quit Smoking / Tobacco Use:    I understand the use of any tobacco products increases my chance of suffering from future heart disease or stroke and could cause other illnesses which may shorten my life. Quitting the use of tobacco products is the single most important thing I can do to improve my health. For further information on smoking / tobacco cessation call a Toll Free Quit Line at 1-803.660.4341 (*National Cancer [place]) or 1-133.398.8271 (American Lung Association) or you can access the web based program at www.lungusa.org.    [place] Tobacco Users Help Line:  (730) 948-4114       Toll Free: 1-388.474.6061  Quit Tobacco Program [facility] (457)399-2527    [facility]  Hotline:    National Crisis Hotline:  5-799-HGAZXVK or 1-697.295.4063    [place] Crisis Hotline:    1-681.850.8151 or 234-223-8460    Discharge Survey:   Thank you for choosing [facility]. We hope we did everything we could to make your hospital stay a pleasant one. You may be receiving a phone survey and we would appreciate your time and participation in answering the questions. Your input is very valuable to us in our efforts to improve our service to our patients and their families.        My signature on this form indicates that:    1. I have reviewed and understand the above information.  2. My questions regarding this information have been answered to my satisfaction.  3. I have formulated a plan with my discharge nurse to obtain my prescribed medications for home.                  Disclaimer         __________________________________                     __________       ________                       Patient Signature                                                 Date                    Time

## 2017-03-08 VITALS
HEART RATE: 105 BPM | DIASTOLIC BLOOD PRESSURE: 47 MMHG | RESPIRATION RATE: 12 BRPM | HEIGHT: 75 IN | TEMPERATURE: 98.2 F | WEIGHT: 263.89 LBS | BODY MASS INDEX: 32.81 KG/M2 | SYSTOLIC BLOOD PRESSURE: 100 MMHG | OXYGEN SATURATION: 93 %

## 2017-03-08 LAB
ERYTHROCYTE [DISTWIDTH] IN BLOOD BY AUTOMATED COUNT: 38.1 FL (ref 35.9–50)
HCT VFR BLD AUTO: 40.4 % (ref 42–52)
HGB BLD-MCNC: 13.9 G/DL (ref 14–18)
MCH RBC QN AUTO: 30 PG (ref 27–33)
MCHC RBC AUTO-ENTMCNC: 34.4 G/DL (ref 33.7–35.3)
MCV RBC AUTO: 87.3 FL (ref 81.4–97.8)
PLATELET # BLD AUTO: 142 K/UL (ref 164–446)
PMV BLD AUTO: 11.2 FL (ref 9–12.9)
RBC # BLD AUTO: 4.63 M/UL (ref 4.7–6.1)
WBC # BLD AUTO: 9.8 K/UL (ref 4.8–10.8)

## 2017-03-08 PROCEDURE — A9270 NON-COVERED ITEM OR SERVICE: HCPCS | Performed by: PHYSICIAN ASSISTANT

## 2017-03-08 PROCEDURE — 700111 HCHG RX REV CODE 636 W/ 250 OVERRIDE (IP): Performed by: PHYSICIAN ASSISTANT

## 2017-03-08 PROCEDURE — 85027 COMPLETE CBC AUTOMATED: CPT

## 2017-03-08 PROCEDURE — 36415 COLL VENOUS BLD VENIPUNCTURE: CPT

## 2017-03-08 PROCEDURE — G8987 SELF CARE CURRENT STATUS: HCPCS | Mod: CI

## 2017-03-08 PROCEDURE — G8988 SELF CARE GOAL STATUS: HCPCS | Mod: CI

## 2017-03-08 PROCEDURE — G8989 SELF CARE D/C STATUS: HCPCS | Mod: CI

## 2017-03-08 PROCEDURE — 700112 HCHG RX REV CODE 229: Performed by: PHYSICIAN ASSISTANT

## 2017-03-08 PROCEDURE — 97165 OT EVAL LOW COMPLEX 30 MIN: CPT

## 2017-03-08 PROCEDURE — 700102 HCHG RX REV CODE 250 W/ 637 OVERRIDE(OP): Performed by: PHYSICIAN ASSISTANT

## 2017-03-08 RX ORDER — OXYCODONE HYDROCHLORIDE 10 MG/1
10-20 TABLET ORAL EVERY 4 HOURS PRN
Qty: 100 TAB | Refills: 0 | Status: SHIPPED | OUTPATIENT
Start: 2017-03-08 | End: 2018-01-25 | Stop reason: SDUPTHER

## 2017-03-08 RX ORDER — CEPHALEXIN 500 MG/1
500 CAPSULE ORAL EVERY 6 HOURS
Qty: 20 CAP | Refills: 0 | Status: SHIPPED | OUTPATIENT
Start: 2017-03-08 | End: 2017-03-13

## 2017-03-08 RX ORDER — CYCLOBENZAPRINE HCL 10 MG
10 TABLET ORAL EVERY 8 HOURS PRN
Qty: 90 TAB | Refills: 0 | Status: SHIPPED | OUTPATIENT
Start: 2017-03-08 | End: 2018-03-19

## 2017-03-08 RX ADMIN — DOCUSATE SODIUM 100 MG: 100 CAPSULE ORAL at 07:33

## 2017-03-08 RX ADMIN — OXYCODONE HYDROCHLORIDE 20 MG: 10 TABLET ORAL at 07:33

## 2017-03-08 RX ADMIN — BENZOCAINE AND MENTHOL 1 LOZENGE: 15; 3.6 LOZENGE ORAL at 07:36

## 2017-03-08 RX ADMIN — PREGABALIN 50 MG: 25 CAPSULE ORAL at 07:33

## 2017-03-08 RX ADMIN — ANTACID TABLETS 500 MG: 500 TABLET, CHEWABLE ORAL at 07:33

## 2017-03-08 RX ADMIN — ALBUTEROL SULFATE 2 PUFF: 90 AEROSOL, METERED RESPIRATORY (INHALATION) at 01:17

## 2017-03-08 RX ADMIN — CEFAZOLIN SODIUM 2 G: 2 INJECTION, SOLUTION INTRAVENOUS at 06:00

## 2017-03-08 RX ADMIN — OXYCODONE HYDROCHLORIDE 20 MG: 10 TABLET ORAL at 03:09

## 2017-03-08 ASSESSMENT — PAIN SCALES - GENERAL
PAINLEVEL_OUTOF10: 7
PAINLEVEL_OUTOF10: 7
PAINLEVEL_OUTOF10: 6
PAINLEVEL_OUTOF10: 5
PAINLEVEL_OUTOF10: 8

## 2017-03-08 ASSESSMENT — ACTIVITIES OF DAILY LIVING (ADL): TOILETING: INDEPENDENT

## 2017-03-08 ASSESSMENT — ENCOUNTER SYMPTOMS
SENSORY CHANGE: 0
FOCAL WEAKNESS: 0
TINGLING: 0

## 2017-03-08 NOTE — DISCHARGE INSTRUCTIONS
Discharge Instructions    Discharged to home by car with relative. Discharged via wheelchair, hospital escort: Yes.  Special equipment needed: Not Applicable    Be sure to schedule a follow-up appointment with your primary care doctor or any specialists as instructed.     Discharge Plan:   Influenza Vaccine Indication: Not indicated: Previously immunized this influenza season and > 8 years of age    I understand that a diet low in cholesterol, fat, and sodium is recommended for good health. Unless I have been given specific instructions below for another diet, I accept this instruction as my diet prescription.   Other diet: Regular    Special Instructions: NO repetitive arms above shoulder level, NO pushing, pulling or lifting greater than 15 lbs , shower ok 24 hours after drain removed, pat incision / steri strips dry, no dressing unless drainage, NO non-steroidal anti-inflammatory meds until cleared by MD (for example Motrin, Ibuprofen, Celebrex), call office for incision redness, drainage, chills or increased temperature, ambulate as much as it is comfortable for you and NO driving for at least 2 weeks following surgery.    · Is patient discharged on Warfarin / Coumadin?   No     · Is patient Post Blood Transfusion?  No    Depression / Suicide Risk    As you are discharged from this [facility], it is important to learn how to keep safe from harming yourself.    Recognize the warning signs:  · Abrupt changes in personality, positive or negative- including increase in energy   · Giving away possessions  · Change in eating patterns- significant weight changes-  positive or negative  · Change in sleeping patterns- unable to sleep or sleeping all the time   · Unwillingness or inability to communicate  · Depression  · Unusual sadness, discouragement and loneliness  · Talk of wanting to die  · Neglect of personal appearance   · Rebelliousness- reckless behavior  · Withdrawal from people/activities they  love  · Confusion- inability to concentrate     If you or a loved one observes any of these behaviors or has concerns about self-harm, here's what you can do:  · Talk about it- your feelings and reasons for harming yourself  · Remove any means that you might use to hurt yourself (examples: pills, rope, extension cords, firearm)  · Get professional help from the community (Mental Health, Substance Abuse, psychological counseling)  · Do not be alone:Call your Safe Contact- someone whom you trust who will be there for you.  · Call your local CRISIS HOTLINE 135-1951 or 481-613-9353  · Call your local Children's Mobile Crisis Response Team [place] (851) 997-9502 or www.[employer]  · Call the toll free National Suicide Prevention Hotlines   · National Suicide Prevention Lifeline 165-415-LUAY (4210)  · National Hope Line Network 800-SUICIDE (688-7412)

## 2017-03-08 NOTE — PROGRESS NOTES
Updated wife via her cell phone, she is at home. Will alert her when room assignment is made. Patient declines to speak with her on PACU phone and states he will call her on his cell.

## 2017-03-08 NOTE — PROGRESS NOTES
HVAC and PIV d/c'd. Went over discharge instructions w/ pt, when to call the doctor, f/u appointments, medications, spinal precautions. Prescriptions and copy of discharge paperwork given to pt. Pt had no further questions, pt discharged to home w/ family, pt refused w/c- walked out.

## 2017-03-08 NOTE — CARE PLAN
Problem: Knowledge Deficit  Goal: Knowledge of disease process/condition, treatment plan, diagnostic tests, and medications will improve  Intervention: Assess knowledge level of disease process/condition, treatment plan, diagnostic tests, and medications  Reviewing plan of care, activities, and medication with patient.  Encouraging patient to ask questions and participate in plan of care.  Providing answers to all questions.  Continuing with current plan of care.  Hourly rounding in practice.      Problem: Pain Management  Goal: Pain level will decrease to patient’s comfort goal  Intervention: Follow pain managment plan developed in collaboration with patient and Interdisciplinary Team  Assessing pain level frequently using 0 to 10 scale.  Providing medication per MAR.  Providing non-pharmacological intervention, therapeutic communication.  Hourly rounding in practice.

## 2017-03-08 NOTE — PROGRESS NOTES
Pt arrived to unit, A&Ox4, complaining of L hand tingling and 7/10 back pain, RN gave Oxy 20 and Flexeril. Normal S1 and S2 heart sounds, respirations are even and unlabored with clear breath sounds on room air, abdomen is soft, nontender with hypoactive bowel sounds. Skin is warm and dry with no breakdown and no edema noted. Hemovac suction, dressing CDI. Pt ambulated 200 ft, standby assist, tolerated well. Call light within reach, will continue to monitor.

## 2017-03-08 NOTE — OR SURGEON
Immediate Post-Operative Note      PreOp Diagnosis: C3-4 DDD, Neck pain, Cervical Radiculopathy    PostOp Diagnosis: same    Procedure(s):  CERVICAL DISK AND FUSION ANTERIOR - Wound Class: Clean with Drain    Surgeon(s):  Timi Cook M.D.    Anesthesiologist/Type of Anesthesia:  Anesthesiologist: Luis Angel Alexis D.O./General    Surgical Staff:  Assistant: Macrina De La Rosa PA-C  Circulator: Tara Padron R.N.  Scrub Person: Shirley Maradiaga  Radiology Technician: Helder SORIANO Gross    Specimen: None    Estimated Blood Loss: 50ccs    Findings: see op report    Complications: none        3/7/2017 4:23 PM Macrina De La Rosa

## 2017-03-08 NOTE — THERAPY
"Occupational Therapy Evaluation completed.   Functional Status: Supv supine > < EOB, supv transfer without AD, supv LB dressing/toileting  Plan of Care: Patient with no further skilled OT needs in the acute care setting at this time  Discharge Recommendations:  Equipment: No Equipment Needed. Post-acute therapy recommended after discharged home.    See \"Rehab Therapy-Acute\" Patient Summary Report for complete documentation.    Pt is completing basic ADL and mobility with no more than supv. Wife available to assist as needed. No further acute OT needs at this time.     "

## 2017-03-08 NOTE — DISCHARGE SUMMARY
DATE OF ADMISSION:  03/07/2017    DATE OF DISCHARGE:  03/08/2017    DISCHARGE DIAGNOSES:  1.  C3-C4 cervical spinal stenosis.  2.  C4 radiculopathy.  3.  Cervical myelopathy.    SURGERY PERFORMED:  C3-C4 anterior cervical diskectomy infusion performed on   03/07/2017 by Dr. Timi Cook.    COMPLICATIONS:  None.    REASON FOR ADMISSION:  This is a 32-year-old male with history of neck pain   and left greater than right upper extremity pain and paresthesia.  His workup   did reveal large disk/osteophyte complex at the level of C3-4 with resultant   cervical stenosis and spinal cord compression.  Due to the degree of stenosis,   patient was indicated for surgical decompression and stabilization.    HOSPITAL COURSE:  Patient was admitted on date of surgery.  He underwent   surgery without complication.  After recovery, he was transferred to the   neurosciences unit.  By postoperative day #1, he is ambulating independently.    His pain is well controlled.  His preoperative deficits are resolved, and at   this point, he is now cleared for discharge home after uneventful hospital   stay.    DISCHARGE INSTRUCTIONS:  Patient is to ambulate as tolerated.  He is to avoid   pushing, pulling or lifting greater than 15 pounds.  It is okay for him to   shower.  He is not to submerge the wound.  It will be okay for him to drive in   2 weeks' time if he is comfortable not taking narcotic pain medications.  He   does have an appointment scheduled in the office of [facility] [place] [facility] in 2 weeks' time.  He will contact our office at any   point should he have any questions or concerns.    DISCHARGE MEDICATIONS:  In addition to his usual home medications, the patient   is discharged home on oxycodone 10 mg, #120, 1-2 p.o. q. 4 hour p.r.n. pain,   Flexeril 10 mg, #90, 1 p.o. q. 8 hours p.r.n. spasm, and Keflex 500 mg, #20, 1   p.o. q.i.d. for 5 days.    All the patient's questions have been answered.  He is  discharged home in   stable condition.       ____________________________________     LAQUITA KNOWLES    DD:  03/08/2017 09:04:39  DT:  03/08/2017 10:55:35    D#:  [id number]  Job#:  [id number]

## 2017-03-08 NOTE — PROGRESS NOTES
Patient is requesting an albuterol inhaler, which he uses PRN at home. Call out to MD, awaiting call back.

## 2017-03-08 NOTE — PROGRESS NOTES
Pt refused bed alarm. Pt educated about safety and importance of bed alarm to prevent falls. Pt still refused bed alarm. Pt educated to call before getting up. Hourly rounding in place.

## 2017-03-08 NOTE — CARE PLAN
Problem: Pain Management  Goal: Pain level will decrease to patient’s comfort goal  Intervention: Follow pain managment plan developed in collaboration with patient and Interdisciplinary Team  Oxy given PRN with +results.       Problem: Mobility  Goal: Risk for activity intolerance will decrease  Intervention: Encourage patient to increase activity level in collaboration with Interdisciplinary Team  Up self with steady gait.

## 2017-03-15 NOTE — OP REPORT
DATE OF SERVICE:  03/07/2017    PREOPERATIVE DIAGNOSES:  1.  Left C4 radiculopathy.  2.  C3-C4 degenerative disk disease.  3.  Bilateral C4 foraminal stenosis.  4.  Neck pain refractory to medical care.    POSTOPERATIVE DIAGNOSES:  1.  Left C4 radiculopathy.  2.  C3-C4 degenerative disk disease.  3.  Bilateral C4 foraminal stenosis.  4.  Neck pain refractory to medical care.    PROCEDURES PERFORMED:  Minimally invasive C3-C4 anterior cervical discectomy   and fusion.  1.  Partial corpectomy of C3.  2.  Partial corpectomy of C4.  3.  C3-C4 arthrodesis with PEEK interbody cage with local morselized   autograft.  4.  Insertion of PEEK interbody cage at 1 level.  5.  C3-C4 anterior cervical [place] plating system from [facility].  6.  Modifier-22 for extra degree of difficulty given the patient's body mass   index of 34 and his extremely deep muscular neck with high exposure at C3-C4,   which added an extra hour to the standard surgical procedure.  7.  Microscope for microdissection of spinal canal.  8.  SSEP, EMG, MEP, motor evoked potential monitoring and recurrent laryngeal   nerve monitoring, which remained stable throughout.    SURGEON:  Timi Cook MD, neurosurgery-spine surgery    ASSISTANT:  Macrina De La Rosa PA-C    ANESTHESIA:  General endotracheal anesthesia.    ANESTHESIOLOGIST:  Luis Angel Alexis DO    COMPLICATIONS:  None.    ESTIMATED BLOOD LOSS:  Less than 10 mL    PREOPERATIVE NOTE:  This extremely pleasant 32-year-old male who presents with   neck pain and left upper extremity, shoulder pain, which was referred left C4   radiculopathy.  The patient failed physical therapy and epidural injections.    An MRI showed C3-C4 degenerative disk disease with cervical stenosis and   foraminal stenosis.  Given the patient's failure to improve with conservative   care, I offered the patient C3-C4 anterior cervical diskectomy with interbody   cage and anterior cervical plating.  I discussed surgical procedure,    alternatives, goals, risks and benefits, complications in detail with the   patient.  I used a bone model, MRI, and educational handout to assist the   patient with his decision making.  I answered all questions to the best of my   ability.  Patient understood and consented to surgery.  I used [facility]   custom 3D animations to also assist the patient with his understanding of   surgical procedure.  Patient understood and consented to surgery.    NARRATIVE DICTATION:  Patient was brought to the operating room and placed   under general endotracheal anesthesia.  He was placed supine on the operating   table with care taken about the bony prominences and peripheral nerves.  His   neck was gently extended over gel pads and secured to the operative table.    Anterior cervical area was prepped and draped in usual sterile fashion.    Following localization with cross table fluoroscopy, a small incision made   right of midline in the neck crease for cosmetic purposes and the platysma was   divided in vertical muscle splitting fashion.  Blunt and sharp dissection was   used to identify the ventral cervical spine between the carotid sheath and   the esophagus.  Longus colli muscles were split longitudinally and self   retaining retraction applied.  Intraoperative x-ray confirmed appropriate   level.  Once excellent exposure was achieved at this high level, which added   extra degree of time, expertise, and effort given his thick muscular neck and   a high exposure at C3-C4, I proceeded to place distractor pins in the bodies   of C3-C4 under fluoroscopy.  The radiolucent retractor system was applied.  A   modifier-22 is required to reflect the extra degree of time, expertise, and   effort for this high exposure, which all added an hour to the standard   surgical procedure.    I then incised the disk at C3-C4, removed the disk from the endplates.  Disk   space was quite arthrodesed and collapsed down and did not distract  up well,   however.  Hence, partial corpectomies at C3 and C4 were completed ensuring   complete decompression of the spinal canal.  Posterior osteophytes were   drilled away.  Microscope was used for microdissection of spinal canal.    Bilateral foraminotomies were completed at C3-C4.  Ligament was undercut and   removed.  Thecal sac and nerve roots were nice and freed up.  I then templated   free appropriate size PEEK interbody cage, and the appropriate lordotic cage   from [facility] was chosen and packed with local morselized autograft and   Actifuse and then delivered under distraction between the body of C3-C4 for an   excellent A plus fit.  Anterior cervical [place] plating system was secured   over the bodies of C3-C4 with appropriate locking screws.  All screws had   excellent purchase.  Locking mechanism was engaged.  AP and lateral x-rays   confirmed excellent position of the cage and the plate with excellent restored   lordosis.  The wound was irrigated with bacitracin irrigation.  The wound was   then closed in multiple layers over a drain brought through a separate   incision given the high exposure.  The standard one level would not require a   drain.  The wound was closed with 3-0 Vicryl to platysma, 3-0 Vicryl   subcuticular with single Steri-Strip to skin.  Small sterile dressing was   applied.  Swabs, needles, instruments correct x2 count.  No complications were   encountered.  Patient tolerated the procedure, was stable and transferred to   recovery room.  Patient will be observed at [facility]   overnight until he meets discharge criteria.    INTRAOPERATIVE FINDINGS:  C3-C4 degenerative disk disease with collapse and   foraminal stenosis, which was all freed up as described.  No complications   were encountered.    Patient will follow up at [facility] in 2   weeks and 6 weeks' time as arranged.       ____________________________________      MD ELAINA CHAVEZ / GERHARD    DD:  03/15/2017 13:32:51  DT:  03/15/2017 14:06:58    D#:  [id number]  Job#:  [id number]    cc: Hien SOLIS

## 2018-01-25 ENCOUNTER — OFFICE VISIT (OUTPATIENT)
Dept: MEDICAL GROUP | Facility: MEDICAL CENTER | Age: 33
End: 2018-01-25
Payer: COMMERCIAL

## 2018-01-25 VITALS
HEART RATE: 96 BPM | WEIGHT: 273 LBS | OXYGEN SATURATION: 90 % | BODY MASS INDEX: 36.18 KG/M2 | HEIGHT: 73 IN | TEMPERATURE: 99.9 F | SYSTOLIC BLOOD PRESSURE: 132 MMHG | DIASTOLIC BLOOD PRESSURE: 84 MMHG

## 2018-01-25 DIAGNOSIS — Z13.220 ENCOUNTER FOR LIPID SCREENING FOR CARDIOVASCULAR DISEASE: ICD-10-CM

## 2018-01-25 DIAGNOSIS — Z79.891 CHRONIC USE OF OPIATE DRUGS THERAPEUTIC PURPOSES: ICD-10-CM

## 2018-01-25 DIAGNOSIS — M54.2 CHRONIC NECK AND BACK PAIN: ICD-10-CM

## 2018-01-25 DIAGNOSIS — G89.29 CHRONIC NECK AND BACK PAIN: ICD-10-CM

## 2018-01-25 DIAGNOSIS — Z13.6 ENCOUNTER FOR LIPID SCREENING FOR CARDIOVASCULAR DISEASE: ICD-10-CM

## 2018-01-25 DIAGNOSIS — M54.9 CHRONIC NECK AND BACK PAIN: ICD-10-CM

## 2018-01-25 DIAGNOSIS — M25.512 CHRONIC LEFT SHOULDER PAIN: ICD-10-CM

## 2018-01-25 DIAGNOSIS — Z00.00 ROUTINE GENERAL MEDICAL EXAMINATION AT A HEALTH CARE FACILITY: ICD-10-CM

## 2018-01-25 DIAGNOSIS — E66.9 OBESITY (BMI 30-39.9): ICD-10-CM

## 2018-01-25 DIAGNOSIS — G89.29 CHRONIC LEFT SHOULDER PAIN: ICD-10-CM

## 2018-01-25 DIAGNOSIS — Z98.890 H/O MENISCECTOMY OF RIGHT KNEE: ICD-10-CM

## 2018-01-25 DIAGNOSIS — Z13.1 DIABETES MELLITUS SCREENING: ICD-10-CM

## 2018-01-25 PROCEDURE — 99203 OFFICE O/P NEW LOW 30 MIN: CPT | Performed by: FAMILY MEDICINE

## 2018-01-25 RX ORDER — BUPROPION HYDROCHLORIDE 100 MG/1
100 TABLET ORAL 2 TIMES DAILY
COMMUNITY

## 2018-01-25 RX ORDER — PREGABALIN 150 MG/1
150 CAPSULE ORAL DAILY
COMMUNITY
End: 2018-03-19

## 2018-01-25 RX ORDER — ACETAMINOPHEN 500 MG
500-1000 TABLET ORAL EVERY 6 HOURS PRN
COMMUNITY

## 2018-01-25 RX ORDER — OXYCODONE HYDROCHLORIDE 10 MG/1
10 TABLET ORAL EVERY 6 HOURS PRN
Qty: 120 TAB | Refills: 0 | Status: SHIPPED | OUTPATIENT
Start: 2018-01-25 | End: 2018-02-17 | Stop reason: SDUPTHER

## 2018-01-25 RX ORDER — METHOCARBAMOL 500 MG/1
1000 TABLET, FILM COATED ORAL 4 TIMES DAILY
COMMUNITY
End: 2018-03-19

## 2018-01-25 RX ORDER — NAPROXEN 500 MG/1
500 TABLET ORAL 2 TIMES DAILY WITH MEALS
COMMUNITY
End: 2019-01-14 | Stop reason: SDUPTHER

## 2018-01-25 ASSESSMENT — PATIENT HEALTH QUESTIONNAIRE - PHQ9: CLINICAL INTERPRETATION OF PHQ2 SCORE: 0

## 2018-01-28 NOTE — ASSESSMENT & PLAN NOTE
Chronic, uncontrolled, but stable.  Patient has relatively full ROM and use of this limb.  Patient is having to take opioid medications chronically, will use warm and cold compresses PRN, OTC Analgesics (topical and oral/systemic), as well.  Please see notes from same date of service 01/25/18 re: chronic use of opiate drugs for therapeutic purposes.

## 2018-01-28 NOTE — ASSESSMENT & PLAN NOTE
Chronic, uncontrolled, but stable.  Patient has decreased ROM to both flexion and extension (flexion is more reduced vs. Extension) as well as rotation of head.      Patient is having to take opioid medications chronically, will use warm and cold compresses PRN, OTC Analgesics (topical and oral/systemic), as well.  Please see notes from same date of service 01/25/18 re: chronic use of opiate drugs for therapeutic purposes.

## 2018-01-28 NOTE — PROGRESS NOTES
Subjective:   Chief Complaint/History of Present Illness:  Danie Pretty is a 33 y.o. male patient new to [facility] who presents today to establish primary medical care and to discuss the following medical conditions.  PMH, PSH, Social History, Medications, Allergies all reviewed as documented:    Chronic use of opiate drugs therapeutic purposes  Chronic, uncontrolled, patient has multiple musculoskeletal conditions that cause him to have chronic pains.  These pains are due to injuries that were sustained 2/2  service (was a [employer] ), for which he has had extensive surgeries (including [facility]).  Patient is in chronic pain that is being well-addressed by Oxycodone 10mg PO TID PRN, which he typically takes as BID.     Patient states that his new job as a  for [employer] does not seem to exacerbate his back pains.    Patient and I discussed that he may require referral to Orthopedic surgery vs. Neurosurgery in the future, depending on how his symptoms progress.    ROS is NEGATIVE for BLE radicular pain, saddle paresthesias, bowel or bladder incontinence, gait instability    ROS is NEGATIVE for respiratory depression, cognitive slowing, constipation, cyanotic skin changes.    Chronic left shoulder pain  Chronic, uncontrolled, but stable.  Patient has relatively full ROM and use of this limb.  Patient is having to take opioid medications chronically, will use warm and cold compresses PRN, OTC Analgesics (topical and oral/systemic), as well.  Please see notes from same date of service 01/25/18 re: chronic use of opiate drugs for therapeutic purposes.    Chronic neck and back pain  Chronic, uncontrolled, but stable.  Patient has decreased ROM to both flexion and extension (flexion is more reduced vs. Extension) as well as rotation of head.      Patient is having to take opioid medications chronically, will use warm and cold compresses PRN, OTC Analgesics (topical and oral/systemic), as well.   Please see notes from same date of service 01/25/18 re: chronic use of opiate drugs for therapeutic purposes.    H/O meniscectomy of right knee  Chronic, uncontrolled, but stable.  Patient has relatively full ROM use of this knee.  He has been told in the past by orthopedic surgeons that his other knee will likely need meniscectomy operations and that both knees will likely require knee replacements in the future.      Patient is having to take opioid medications chronically, will use warm and cold compresses PRN, OTC Analgesics (topical and oral/systemic), as well.  Please see notes from same date of service 01/25/18 re: chronic use of opiate drugs for therapeutic purposes.    Obesity (BMI 30-39.9)  Chronic, uncontrolled, patient finds difficulty with participating with cardiovascular exercise 2/2 multiple musculoskeletal conditions.  Please see notes from same date of service 01/25/18 re: neck, shoulder, and knee conditions.    ROS is NEGATIVE for: cold or heat intolerance, anxiety/depression, chest pain/pressure, palpitations, hair thickening/coarsening/falling out/thinning, skin changes, diarrhea/constipation, unexpected weight change.    ROS is NEGATIVE for blurred vision, polydipsia, polyuria, diaphoresis, palpitations, fatigue, irritability, flank pain, BLE paresthesias.      Patient Active Problem List    Diagnosis Date Noted   • Obesity (BMI 30-39.9) 01/25/2018   • Chronic left shoulder pain 01/25/2018   • H/O meniscectomy of right knee 01/25/2018   • Chronic use of opiate drugs therapeutic purposes 01/25/2018   • Chronic neck and back pain 03/07/2017       Additional History:   Allergies:    Patient has no known allergies.     Medications:     Current Outpatient Prescriptions Ordered in [facility]   Medication Sig Dispense Refill   • acetaminophen (TYLENOL) 500 MG Tab Take 500-1,000 mg by mouth every 6 hours as needed.     • Albuterol Sulfate 108 (90 Base) MCG/ACT AEROSOL POWDER, BREATH ACTIVATED Inhale  by  "mouth.     • buPROPion (WELLBUTRIN) 100 MG Tab Take 100 mg by mouth 2 times a day.     • methocarbamol (ROBAXIN) 500 MG Tab Take 1,000 mg by mouth 4 times a day.     • naproxen (NAPROSYN) 500 MG Tab Take 500 mg by mouth 2 times a day, with meals.     • pregabalin (LYRICA) 150 MG Cap Take 150 mg by mouth every day.     • oxycodone immediate release (ROXICODONE) 10 MG immediate release tablet Take 1 Tab by mouth every 6 hours as needed for Moderate Pain or Severe Pain for up to 30 days. 120 Tab 0   • divalproex (DEPAKOTE) 500 MG Tablet Delayed Response Take 1,000 mg by mouth every bedtime.     • lidocaine (LIDODERM) 5 % Patch Apply 1 Patch to skin as directed 1 time daily as needed.     • cyclobenzaprine (FLEXERIL) 10 MG Tab Take 1 Tab by mouth every 8 hours as needed for Muscle Spasms. 90 Tab 0     No current Epic-ordered facility-administered medications on file.         Past Medical History:     Past Medical History:   Diagnosis Date   • Anesthesia     Pt states \"woke up during shoulder surgery both times\".   • Arthritis     Osteo - Left shoulder, R knee.   • Bowel habit changes     Constipation   • Pain     Left shoulder, neck, back and right knee.   • Psychiatric problem     Depression, anxiety, and PTSD.        Past Surgical History:     Past Surgical History:   Procedure Laterality Date   • CERVICAL DISK AND FUSION ANTERIOR  3/7/2017    Procedure: CERVICAL DISK AND FUSION ANTERIOR;  Surgeon: Timi Cook M.D.;  Location: SURGERY [facility];  Service:    • OTHER  2015    Right knee miniscus.   • OTHER  2007    Left shoulder SLAP tear.   • TONSILLECTOMY          Social History:     Social History   Substance Use Topics   • Smoking status: Former Smoker     Packs/day: 1.00     Years: 13.00     Types: Cigarettes     Quit date: 1/1/2015   • Smokeless tobacco: Former User     Types: Chew   • Alcohol use Yes      Comment: Rarely drinks alcohol. Pt chewed tabacco years ago didn't chew tabacco every day. " "       Family History:     No family status information on file.      No family history on file.    ROS:     - Constitutional: Negative for fever, chills, unexpected weight change, and fatigue/generalized weakness.     - Respiratory: Negative for cough, sputum production, chest congestion, dyspnea, wheezing, and crackles.      - Cardiovascular: Negative for chest pain, palpitations, orthopnea, and bilateral lower extremity edema.     - Gastrointestinal: Negative for heartburn, nausea, vomiting, abdominal pain, hematochezia, melena, diarrhea, constipation, and greasy/foul-smelling stools.     - NOTE: All other systems reviewed and are negative, except as in HPI.     Objective:   Physical Exam:    Vitals: Blood pressure 132/84, pulse 96, temperature 37.7 °C (99.9 °F), height 1.854 m (6' 1\"), weight 123.8 kg (273 lb), SpO2 90 %.   BMI: Body mass index is 36.02 kg/m².   General/Constitutional: Vitals as above, Well nourished, well developed male in no acute distress   Head/Eyes:  - Head is grossly normal & atraumatic  - Bilateral conjunctivae clear and not injected, bilateral EOMI, bilateral PERRL   ENT:   - Bilateral external ears grossly normal in appearance, external auditory canals clear & bilateral TMs visualized with appropriate cone of light reflex, hearing grossly intact  - External nares normal in appearance and without discharge/bleeding, bilateral turbinates non-erythematous/non-edematous and without discharge/bleeding  - Good dentition,  posterior oropharynx without erythema/edema/exudates  Neck: Neck supple, no masses, neck non-tender to palpation, no thyromegaly/goiter   Respiratory: No respiratory distress, bilateral lungs are clear to auscultation in all lung fields (anterior/lateral/posterior), no wheezing/rhonchi/rales   Cardiovascular: Regular rate and rhythm without murmur/gallops/rubs, distal pulses equal and 2+ bilaterally (radial, posterior tibial), no bilateral lower extremity " edema   Gastrointestinal: Abdomen resonant to percussion, Bowel sounds present in all 4 quadrants, abdomen non-tender to light and deep palpation    MSK: Decreased range of motion to flexion & extension of neck as well as rotation, mild to moderate crepitus of L shoulder on passive range of motion, bilateral knees with crepitus on passive ROM (R>L), Gait grossly normal & not antalgic, no tenderness to percussion of vertebral processes, no CVAT, no bilateral SI joint tenderness   Integumentary: No apparent rashes   Neuro: Gross motor movement intact in all 4 extremities, Gross sensation intact to extremities and trunk, Gait grossly normal and not antalgic   Psych: Judgment grossly appropriate, no apparent depression/anxiety    Health Maintenance:     - I have [facility] ([facility] and will update accordingly.    Imaging/Labs:     - I have [facility] ([facility]), and will update accordingly.    Assessment and Plan:   1. Chronic use of opiate drugs therapeutic purposes  2. Chronic left shoulder pain  3. Chronic neck and back pain  4. H/O meniscectomy of right knee  Chronic, uncontrolled, addressed with chronic opiate medicates.  We discussed [place] [facility] including but not limited to urine drug screening policy, interval monitoring by diagnostic imaging (and potentially other tests, as well), possible needs for referrals.  Patient is in agreement.  Review of opendorse reveals appropriate filling behaviors at this time.   - oxycodone immediate release (ROXICODONE) 10 MG immediate release tablet; Take 1 Tab by mouth every 6 hours as needed for Moderate Pain or Severe Pain for up to 30 days.  Dispense: 120 Tab; Refill: 0   - [facility] SCREEN; Future   - CONTROLLED SUBSTANCE TREATMENT AGREEMENT   - CONSENT FOR OPIATE PRESCRIPTION    5. Obesity (BMI 30-39.9)  Chronic, uncontrolled, discussed lifestyle changes.   - Patient identified as having weight  management issue.  Appropriate orders and counseling given.    6. Routine general medical examination at a health care facility  7. Diabetes mellitus screening  8. Encounter for lipid screening for cardiovascular disease  Unknown control of metabolic health. Labs as below to evaluate.   - HEMOGLOBIN A1C; Future    - COMP METABOLIC PANEL; Future   - LIPID PROFILE; Future   - [facility] SCREEN; Future   - CONTROLLED SUBSTANCE TREATMENT AGREEMENT    RTC: in 3 months for pain management, also for review of previous records.    PLEASE NOTE: This dictation was created using voice recognition software. I have made every reasonable attempt to correct obvious errors, but I expect that there are errors of grammar and possibly content that I did not discover before finalizing the note.

## 2018-01-28 NOTE — ASSESSMENT & PLAN NOTE
Chronic, uncontrolled, but stable.  Patient has relatively full ROM use of this knee.  He has been told in the past by orthopedic surgeons that his other knee will likely need meniscectomy operations and that both knees will likely require knee replacements in the future.      Patient is having to take opioid medications chronically, will use warm and cold compresses PRN, OTC Analgesics (topical and oral/systemic), as well.  Please see notes from same date of service 01/25/18 re: chronic use of opiate drugs for therapeutic purposes.

## 2018-01-28 NOTE — ASSESSMENT & PLAN NOTE
Chronic, uncontrolled, patient finds difficulty with participating with cardiovascular exercise 2/2 multiple musculoskeletal conditions.  Please see notes from same date of service 01/25/18 re: neck, shoulder, and knee conditions.    ROS is NEGATIVE for: cold or heat intolerance, anxiety/depression, chest pain/pressure, palpitations, hair thickening/coarsening/falling out/thinning, skin changes, diarrhea/constipation, unexpected weight change.    ROS is NEGATIVE for blurred vision, polydipsia, polyuria, diaphoresis, palpitations, fatigue, irritability, flank pain, BLE paresthesias.

## 2018-01-28 NOTE — ASSESSMENT & PLAN NOTE
Chronic, uncontrolled, patient has multiple musculoskeletal conditions that cause him to have chronic pains.  These pains are due to injuries that were sustained 2/2  service (was a [employer] ), for which he has had extensive surgeries (including [facility]).  Patient is in chronic pain that is being well-addressed by Oxycodone 10mg PO TID PRN, which he typically takes as BID.     Patient states that his new job as a  for [employer] does not seem to exacerbate his back pains.    Patient and I discussed that he may require referral to Orthopedic surgery vs. Neurosurgery in the future, depending on how his symptoms progress.    ROS is NEGATIVE for BLE radicular pain, saddle paresthesias, bowel or bladder incontinence, gait instability    ROS is NEGATIVE for respiratory depression, cognitive slowing, constipation, cyanotic skin changes.

## 2018-02-21 DIAGNOSIS — M54.2 CHRONIC NECK AND BACK PAIN: ICD-10-CM

## 2018-02-21 DIAGNOSIS — M25.512 CHRONIC LEFT SHOULDER PAIN: ICD-10-CM

## 2018-02-21 DIAGNOSIS — Z79.891 CHRONIC USE OF OPIATE DRUGS THERAPEUTIC PURPOSES: ICD-10-CM

## 2018-02-21 DIAGNOSIS — Z98.890 H/O MENISCECTOMY OF RIGHT KNEE: ICD-10-CM

## 2018-02-21 DIAGNOSIS — G89.29 CHRONIC LEFT SHOULDER PAIN: ICD-10-CM

## 2018-02-21 DIAGNOSIS — M54.9 CHRONIC NECK AND BACK PAIN: ICD-10-CM

## 2018-02-21 DIAGNOSIS — G89.29 CHRONIC NECK AND BACK PAIN: ICD-10-CM

## 2018-02-21 RX ORDER — OXYCODONE HYDROCHLORIDE 10 MG/1
10 TABLET ORAL EVERY 6 HOURS PRN
Qty: 120 TAB | Refills: 0 | Status: SHIPPED | OUTPATIENT
Start: 2018-02-24 | End: 2018-02-21 | Stop reason: SDUPTHER

## 2018-02-21 RX ORDER — OXYCODONE HYDROCHLORIDE 10 MG/1
10 TABLET ORAL EVERY 6 HOURS PRN
Qty: 120 TAB | Refills: 0 | Status: SHIPPED | OUTPATIENT
Start: 2018-03-26 | End: 2018-03-19

## 2018-02-21 RX ORDER — OXYCODONE HYDROCHLORIDE 10 MG/1
10 TABLET ORAL EVERY 6 HOURS PRN
Qty: 120 TAB | Refills: 0 | OUTPATIENT
Start: 2018-02-21 | End: 2018-03-23

## 2018-02-21 NOTE — TELEPHONE ENCOUNTER
Regarding: Prescription Question  ----- Message from Mandie Mckeon Med Ass't sent at 2/21/2018 10:29 AM PST -----       ----- Message from Danie Pretty to Conrad Huston M.D. sent at 2/21/2018  8:48 AM -----   Good morning doctor,  Wanted to ask weather or not you received my prescription request for my pain meds   because I down loaded the luke to try and make this easier sense I'm always at work. Thank you again.

## 2018-02-21 NOTE — TELEPHONE ENCOUNTER
Was the patient seen in the last year in this department? Yes     Does patient have an active prescription for medications requested? Yes     Received Request Via: Patient       Pt only had one saulo script in pharmacy

## 2018-02-21 NOTE — TELEPHONE ENCOUNTER
From: Danie Pretty  Sent: 2/21/2018 12:36 PM PST  Subject: Medication Renewal Request    Danie Pretty would like a refill of the following medications:     oxycodone immediate release (ROXICODONE) 10 MG immediate release tablet [Conrad Huston M.D.]    Preferred pharmacy: [facility] [address] [place] [address], [place] [address] [address] AT [facility] [address]    Comment:

## 2018-03-19 ENCOUNTER — APPOINTMENT (OUTPATIENT)
Dept: RADIOLOGY | Facility: IMAGING CENTER | Age: 33
End: 2018-03-19
Attending: PHYSICIAN ASSISTANT
Payer: COMMERCIAL

## 2018-03-19 ENCOUNTER — OCCUPATIONAL MEDICINE (OUTPATIENT)
Dept: URGENT CARE | Facility: CLINIC | Age: 33
End: 2018-03-19
Payer: COMMERCIAL

## 2018-03-19 VITALS
BODY MASS INDEX: 36.84 KG/M2 | SYSTOLIC BLOOD PRESSURE: 162 MMHG | RESPIRATION RATE: 16 BRPM | DIASTOLIC BLOOD PRESSURE: 98 MMHG | OXYGEN SATURATION: 93 % | HEART RATE: 102 BPM | WEIGHT: 278 LBS | TEMPERATURE: 98.8 F | HEIGHT: 73 IN

## 2018-03-19 DIAGNOSIS — S16.1XXA STRAIN OF NECK MUSCLE, INITIAL ENCOUNTER: ICD-10-CM

## 2018-03-19 DIAGNOSIS — S46.912A STRAIN OF LEFT SHOULDER, INITIAL ENCOUNTER: ICD-10-CM

## 2018-03-19 DIAGNOSIS — Z02.1 PRE-EMPLOYMENT DRUG SCREENING: ICD-10-CM

## 2018-03-19 LAB
AMP AMPHETAMINE: NORMAL
BREATH ALCOHOL COMMENT: 0
COC COCAINE: NORMAL
INT CON NEG: NORMAL
INT CON POS: NORMAL
MET METHAMPHETAMINES: NORMAL
OPI OPIATES: NORMAL
PCP PHENCYCLIDINE: NORMAL
POC BREATHALIZER: NORMAL PERCENT (ref 0–0.01)
POC DRUG COMMENT 753798-OCCUPATIONAL HEALTH: NORMAL
THC: NORMAL

## 2018-03-19 PROCEDURE — 99203 OFFICE O/P NEW LOW 30 MIN: CPT | Mod: 29 | Performed by: PHYSICIAN ASSISTANT

## 2018-03-19 PROCEDURE — 73030 X-RAY EXAM OF SHOULDER: CPT | Mod: TC,LT,29 | Performed by: PHYSICIAN ASSISTANT

## 2018-03-19 PROCEDURE — 80305 DRUG TEST PRSMV DIR OPT OBS: CPT | Performed by: PHYSICIAN ASSISTANT

## 2018-03-19 PROCEDURE — 82075 ASSAY OF BREATH ETHANOL: CPT | Mod: 29 | Performed by: PHYSICIAN ASSISTANT

## 2018-03-19 ASSESSMENT — ENCOUNTER SYMPTOMS
CONSTITUTIONAL NEGATIVE: 1
NEUROLOGICAL NEGATIVE: 1

## 2018-03-19 ASSESSMENT — PAIN SCALES - GENERAL: PAINLEVEL: 8=MODERATE-SEVERE PAIN
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
## 2018-03-20 ENCOUNTER — OCCUPATIONAL MEDICINE (OUTPATIENT)
Dept: URGENT CARE | Facility: CLINIC | Age: 33
End: 2018-03-20
Payer: COMMERCIAL

## 2018-03-20 ENCOUNTER — PATIENT MESSAGE (OUTPATIENT)
Dept: MEDICAL GROUP | Facility: MEDICAL CENTER | Age: 33
End: 2018-03-20

## 2018-03-20 VITALS
SYSTOLIC BLOOD PRESSURE: 132 MMHG | TEMPERATURE: 98 F | BODY MASS INDEX: 36.84 KG/M2 | RESPIRATION RATE: 16 BRPM | HEIGHT: 73 IN | WEIGHT: 278 LBS | HEART RATE: 84 BPM | DIASTOLIC BLOOD PRESSURE: 88 MMHG | OXYGEN SATURATION: 93 %

## 2018-03-20 DIAGNOSIS — S16.1XXD STRAIN OF NECK MUSCLE, SUBSEQUENT ENCOUNTER: ICD-10-CM

## 2018-03-20 DIAGNOSIS — S46.912D STRAIN OF LEFT SHOULDER, SUBSEQUENT ENCOUNTER: ICD-10-CM

## 2018-03-20 PROCEDURE — 99213 OFFICE O/P EST LOW 20 MIN: CPT | Mod: 29 | Performed by: NURSE PRACTITIONER

## 2018-03-20 ASSESSMENT — ENCOUNTER SYMPTOMS
RESPIRATORY NEGATIVE: 1
NECK PAIN: 1
NEUROLOGICAL NEGATIVE: 1
MYALGIAS: 0
CARDIOVASCULAR NEGATIVE: 1
FALLS: 0
BACK PAIN: 1
CONSTITUTIONAL NEGATIVE: 1
GASTROINTESTINAL NEGATIVE: 1

## 2018-03-20 ASSESSMENT — PAIN SCALES - GENERAL: PAINLEVEL: 7=MODERATE-SEVERE PAIN
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
## 2018-03-21 NOTE — TELEPHONE ENCOUNTER
From: Danie Pretty  To: Conrad Huston M.D.  Sent: 3/20/2018 3:08 PM PDT  Subject: Non-Urgent Medical Question    It says im due for some shots. The flu was done last friday and my dtap ttap was done feb 17th 2017. That way we can update it in the system.

## 2018-03-23 ENCOUNTER — OCCUPATIONAL MEDICINE (OUTPATIENT)
Dept: URGENT CARE | Facility: CLINIC | Age: 33
End: 2018-03-23
Payer: COMMERCIAL

## 2018-03-23 VITALS
SYSTOLIC BLOOD PRESSURE: 128 MMHG | DIASTOLIC BLOOD PRESSURE: 90 MMHG | OXYGEN SATURATION: 93 % | RESPIRATION RATE: 16 BRPM | BODY MASS INDEX: 36.84 KG/M2 | HEART RATE: 94 BPM | WEIGHT: 278 LBS | HEIGHT: 73 IN | TEMPERATURE: 98.3 F

## 2018-03-23 DIAGNOSIS — M54.2 CERVICAL PAIN: ICD-10-CM

## 2018-03-23 DIAGNOSIS — S46.912D STRAIN OF LEFT SHOULDER, SUBSEQUENT ENCOUNTER: ICD-10-CM

## 2018-03-23 PROCEDURE — 99213 OFFICE O/P EST LOW 20 MIN: CPT | Mod: 29 | Performed by: NURSE PRACTITIONER

## 2018-03-23 ASSESSMENT — ENCOUNTER SYMPTOMS
NAUSEA: 0
FALLS: 0
FEVER: 0
FOCAL WEAKNESS: 0
SENSORY CHANGE: 0

## 2018-03-23 ASSESSMENT — PAIN SCALES - GENERAL: PAINLEVEL: 5=MODERATE PAIN
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
## 2018-04-03 ENCOUNTER — OCCUPATIONAL MEDICINE (OUTPATIENT)
Dept: URGENT CARE | Facility: CLINIC | Age: 33
End: 2018-04-03
Payer: COMMERCIAL

## 2018-04-03 VITALS
BODY MASS INDEX: 35.78 KG/M2 | RESPIRATION RATE: 12 BRPM | HEIGHT: 73 IN | SYSTOLIC BLOOD PRESSURE: 120 MMHG | TEMPERATURE: 98.4 F | HEART RATE: 94 BPM | OXYGEN SATURATION: 95 % | WEIGHT: 270 LBS | DIASTOLIC BLOOD PRESSURE: 86 MMHG

## 2018-04-03 DIAGNOSIS — S46.912D STRAIN OF LEFT SHOULDER, SUBSEQUENT ENCOUNTER: ICD-10-CM

## 2018-04-03 DIAGNOSIS — S16.1XXD STRAIN OF NECK MUSCLE, SUBSEQUENT ENCOUNTER: ICD-10-CM

## 2018-04-03 PROCEDURE — 99213 OFFICE O/P EST LOW 20 MIN: CPT | Performed by: NURSE PRACTITIONER

## 2018-04-03 RX ORDER — OXYCODONE HYDROCHLORIDE 10 MG/1
TABLET ORAL
Refills: 0 | COMMUNITY
Start: 2018-03-29 | End: 2018-05-14 | Stop reason: SDUPTHER

## 2018-04-03 ASSESSMENT — ENCOUNTER SYMPTOMS
NEUROLOGICAL NEGATIVE: 1
GASTROINTESTINAL NEGATIVE: 1
CARDIOVASCULAR NEGATIVE: 1
NECK PAIN: 1
RESPIRATORY NEGATIVE: 1
CONSTITUTIONAL NEGATIVE: 1

## 2018-04-03 ASSESSMENT — PAIN SCALES - GENERAL: PAINLEVEL: 5=MODERATE PAIN
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
## 2018-04-24 ENCOUNTER — APPOINTMENT (OUTPATIENT)
Dept: URGENT CARE | Facility: CLINIC | Age: 33
End: 2018-04-24
Payer: COMMERCIAL

## 2018-05-08 ENCOUNTER — TELEPHONE (OUTPATIENT)
Dept: MEDICAL GROUP | Facility: MEDICAL CENTER | Age: 33
End: 2018-05-08

## 2018-05-09 NOTE — TELEPHONE ENCOUNTER
----- Message from Danie Pretty sent at 5/8/2018  6:58 PM PDT -----  Regarding: RE: RE: RE: Prescription Question  Contact: [phone]  Hello sir  I am hurting pretty bad, I was trying to wait till our appointment but if you could recommend someone to see earlier it would be great. I'm barely sleeping due to it right now. Thank you.     ----- Message -----  From: Conrad Huston M.D.  Sent: 4/24/18 14:28  To: Danie Pretty  Subject: RE: RE: Prescription Question    Greetings Mr. Pretty,      You're welcome.  I hope you feel better soon.    Sincerely,   Dr. Huston      ----- Message -----     From: Danie Pretty     Sent: 4/24/2018  1:04 PM PDT       To: Conrad Huston M.D.  Subject: RE: RE: Prescription Question    Than k you sir I will jump on that.     ----- Message -----  From: Conrad Huston M.D.  Sent: 4/24/18 13:03  To: Danie Pretty  Subject: RE: Prescription Question    Greetings Mr. Pretty,     Unfortunately, a clinic visit is required in order to evaluate for necessity for antibiotics.  My schedule is fully booked today, but you may be able to get a same-day visit with another primary care provider within [facility], somewhere.  Otherwise, an Urgent Care visit is also an option.    Sincerely,  Dr. Huston      ----- Message -----     From: Danie Pretty     Sent: 4/24/2018  9:56 AM PDT       To: Conrad Huston M.D.  Subject: Prescription Question    It looks like my sinobroncitus came back it their anywa_ that I can get another round of Amoxcillian? I really cant afford to miss work and between my head and my chest killing me I was sent home today. [facility] but I don't have a direct way to talk to them like this. While it would be free to have them give it too me this way is much easier.

## 2018-05-14 ENCOUNTER — OFFICE VISIT (OUTPATIENT)
Dept: MEDICAL GROUP | Facility: MEDICAL CENTER | Age: 33
End: 2018-05-14
Payer: COMMERCIAL

## 2018-05-14 VITALS
HEIGHT: 73 IN | BODY MASS INDEX: 34.97 KG/M2 | WEIGHT: 263.89 LBS | DIASTOLIC BLOOD PRESSURE: 78 MMHG | HEART RATE: 104 BPM | SYSTOLIC BLOOD PRESSURE: 116 MMHG | OXYGEN SATURATION: 99 % | TEMPERATURE: 98 F

## 2018-05-14 DIAGNOSIS — M54.9 CHRONIC NECK AND BACK PAIN: ICD-10-CM

## 2018-05-14 DIAGNOSIS — M54.2 CHRONIC NECK AND BACK PAIN: ICD-10-CM

## 2018-05-14 DIAGNOSIS — G89.29 CHRONIC LEFT SHOULDER PAIN: ICD-10-CM

## 2018-05-14 DIAGNOSIS — G89.4 CHRONIC PAIN SYNDROME: ICD-10-CM

## 2018-05-14 DIAGNOSIS — F11.20 OPIOID TYPE DEPENDENCE, CONTINUOUS USE (HCC): ICD-10-CM

## 2018-05-14 DIAGNOSIS — M25.512 CHRONIC LEFT SHOULDER PAIN: ICD-10-CM

## 2018-05-14 DIAGNOSIS — G89.29 CHRONIC NECK AND BACK PAIN: ICD-10-CM

## 2018-05-14 PROCEDURE — 99214 OFFICE O/P EST MOD 30 MIN: CPT | Performed by: PHYSICIAN ASSISTANT

## 2018-05-14 RX ORDER — OXYCODONE HYDROCHLORIDE 10 MG/1
10 TABLET ORAL EVERY 6 HOURS PRN
Qty: 120 TAB | Refills: 0 | Status: SHIPPED | OUTPATIENT
Start: 2018-05-14 | End: 2018-06-29 | Stop reason: SDUPTHER

## 2018-05-14 NOTE — PROGRESS NOTES
Subjective:   Danie Pretty is a 33 y.o. male here today for chronic neck and back pain and other joints requiring chronic opioid use.    Chronic neck and back pain  This is a 33-year-old male who is here today to discuss his chronic pain. His chronic pain syndrome. Multiple joints of his body. Back and neck. Shoulders knees. Hips. His several injuries on the . Prior to following up at [facility]. He is currently on oxycodone 10 mg 4 times a day. Typically will take 2 tablets in the morning and then 2 when he gets home. He is unable take medication while at work. He works at [employer].       Current medicines (including changes today)  Current Outpatient Prescriptions   Medication Sig Dispense Refill   • oxyCODONE immediate release (ROXICODONE) 10 MG immediate release tablet Take 1 Tab by mouth every 6 hours as needed for Moderate Pain for up to 30 days. 120 Tab 0   • acetaminophen (TYLENOL) 500 MG Tab Take 500-1,000 mg by mouth every 6 hours as needed.     • Albuterol Sulfate 108 (90 Base) MCG/ACT AEROSOL POWDER, BREATH ACTIVATED Inhale  by mouth.     • buPROPion (WELLBUTRIN) 100 MG Tab Take 100 mg by mouth 2 times a day.     • naproxen (NAPROSYN) 500 MG Tab Take 500 mg by mouth 2 times a day, with meals.     • divalproex (DEPAKOTE) 500 MG Tablet Delayed Response Take 1,000 mg by mouth every bedtime.     • lidocaine (LIDODERM) 5 % Patch Apply 1 Patch to skin as directed 1 time daily as needed.       No current facility-administered medications for this visit.      He  has a past medical history of Anesthesia; Arthritis; Bowel habit changes; Pain; and Psychiatric problem.    Social History and Family History were reviewed and updated.    ROS   No chest pain, no shortness of breath, no abdominal pain and all other systems were reviewed and are negative.       Objective:     Blood pressure 116/78, pulse (!) 104, temperature 36.7 °C  "(98 °F), height 1.86 m (6' 1.23\"), weight 119.7 kg (263 lb 14.3 oz), SpO2 99 %. Body mass index is 34.6 kg/m².   Physical Exam:  Constitutional: Alert, no distress.  Skin: Warm, dry, good turgor, no rashes in visible areas.  Eye: Equal, round and reactive, conjunctiva clear, lids normal.  ENMT: Lips without lesions, good dentition, oropharynx clear.  Neck: Trachea midline, no masses.   Lymph: No cervical or supraclavicular lymphadenopathy  Respiratory: Unlabored respiratory effort, lungs appear clear, no wheezes.  Cardiovascular: Normal S1, S2, no murmur, no edema.  Psych: Alert and oriented x3, normal affect and mood.        Assessment and Plan:   The following treatment plan was discussed    1. Chronic neck and back pain  Chronic condition.  reviewed. I agree medication is appropriate. Controlled substance agreement on file. Renewed Roxicodone 10 mg 4 times a day as needed. Provided a 30 day supply. Advised follow-up with his PCP appointment at the end of the month. Advised I would not be able to provide him a longer course in 30 days. He was understanding. Opioid risk score at 10.  - oxyCODONE immediate release (ROXICODONE) 10 MG immediate release tablet; Take 1 Tab by mouth every 6 hours as needed for Moderate Pain for up to 30 days.  Dispense: 120 Tab; Refill: 0    2. Chronic left shoulder pain  Chronic condition.  reviewed. I agree medication is appropriate. Controlled substance agreement on file. Renewed medications.  - oxyCODONE immediate release (ROXICODONE) 10 MG immediate release tablet; Take 1 Tab by mouth every 6 hours as needed for Moderate Pain for up to 30 days.  Dispense: 120 Tab; Refill: 0    3. Opioid type dependence, continuous use (HCC)  Chronic condition. Follow-up with PCP for refills.      Followup: Return in about 4 weeks (around 6/11/2018), or if symptoms worsen or fail to improve.    Please note that this dictation was created using voice recognition software. I have made every " reasonable attempt to correct obvious errors, but I expect that there are errors of grammar and possibly content that I did not discover before finalizing the note.

## 2018-05-14 NOTE — ASSESSMENT & PLAN NOTE
This is a 33-year-old male who is here today to discuss his chronic pain. His chronic pain syndrome. Multiple joints of his body. Back and neck. Shoulders knees. Hips. His several injuries on the . [facility]. He is currently on oxycodone 10 mg 4 times a day. Typically will take 2 tablets in the morning and then 2 when he gets home. He is unable take medication while at work. He works at [employer].

## 2018-06-29 ENCOUNTER — OFFICE VISIT (OUTPATIENT)
Dept: MEDICAL GROUP | Facility: MEDICAL CENTER | Age: 33
End: 2018-06-29
Payer: COMMERCIAL

## 2018-06-29 ENCOUNTER — TELEPHONE (OUTPATIENT)
Dept: MEDICAL GROUP | Facility: MEDICAL CENTER | Age: 33
End: 2018-06-29

## 2018-06-29 VITALS
HEART RATE: 87 BPM | DIASTOLIC BLOOD PRESSURE: 78 MMHG | OXYGEN SATURATION: 96 % | SYSTOLIC BLOOD PRESSURE: 122 MMHG | WEIGHT: 253 LBS | HEIGHT: 73 IN | BODY MASS INDEX: 33.53 KG/M2 | TEMPERATURE: 98.5 F

## 2018-06-29 DIAGNOSIS — M54.2 CHRONIC NECK AND BACK PAIN: ICD-10-CM

## 2018-06-29 DIAGNOSIS — Z98.890 H/O MENISCECTOMY OF RIGHT KNEE: ICD-10-CM

## 2018-06-29 DIAGNOSIS — E66.9 OBESITY (BMI 30-39.9): ICD-10-CM

## 2018-06-29 DIAGNOSIS — M54.9 CHRONIC NECK AND BACK PAIN: ICD-10-CM

## 2018-06-29 DIAGNOSIS — G89.29 CHRONIC NECK AND BACK PAIN: ICD-10-CM

## 2018-06-29 DIAGNOSIS — G89.29 CHRONIC LEFT SHOULDER PAIN: ICD-10-CM

## 2018-06-29 DIAGNOSIS — M25.512 CHRONIC LEFT SHOULDER PAIN: ICD-10-CM

## 2018-06-29 PROCEDURE — 99214 OFFICE O/P EST MOD 30 MIN: CPT | Performed by: PHYSICIAN ASSISTANT

## 2018-06-29 RX ORDER — OXYCODONE HYDROCHLORIDE 10 MG/1
10 TABLET ORAL EVERY 6 HOURS PRN
Qty: 120 TAB | Refills: 0 | Status: SHIPPED | OUTPATIENT
Start: 2018-06-29 | End: 2018-06-29 | Stop reason: SDUPTHER

## 2018-06-29 RX ORDER — OXYCODONE HYDROCHLORIDE 10 MG/1
10 TABLET ORAL EVERY 6 HOURS PRN
Qty: 120 TAB | Refills: 0 | Status: SHIPPED | OUTPATIENT
Start: 2018-07-29 | End: 2018-07-13 | Stop reason: SDUPTHER

## 2018-06-29 NOTE — ASSESSMENT & PLAN NOTE
States that he uses a brace as well as a cane at times. Complains of chronic knee pain. States that the [facility] is discussing a procedure for the knee.

## 2018-06-29 NOTE — PROGRESS NOTES
Subjective:   Danie Pretty is a 33 y.o. male here today for chronic neck and back pain as well as right knee pain.    Chronic neck and back pain  This is a 33-year-old male that unfortunately missed his appointment yesterday with his PCP. Has chronic neck and back pain as well as knee pain on the right side. [facility] but [facility] no [facility]. Today brought in a disc for his previous medical records. He also has had disability placards given new in the past for his chronic concerns with his joints. He is requesting a form to be completed. He is out of his narcotics. I did provide in the last prescription as he saw me. Initially though he was seen by Dr. Huston. Plans to see him again for refills.    Obesity (BMI 30-39.9)  He's been doing better with exercising and using the stairs. Also eating healthier. His lost about 10 pounds.    H/O meniscectomy of right knee  States that he uses a brace as well as a cane at times. Complains of chronic knee pain. States that the [facility] is discussing a procedure for the knee.       Current medicines (including changes today)  Current Outpatient Prescriptions   Medication Sig Dispense Refill   • [START ON 7/29/2018] oxyCODONE immediate release (ROXICODONE) 10 MG immediate release tablet Take 1 Tab by mouth every 6 hours as needed for Moderate Pain for up to 30 days. 120 Tab 0   • acetaminophen (TYLENOL) 500 MG Tab Take 500-1,000 mg by mouth every 6 hours as needed.     • Albuterol Sulfate 108 (90 Base) MCG/ACT AEROSOL POWDER, BREATH ACTIVATED Inhale  by mouth.     • buPROPion (WELLBUTRIN) 100 MG Tab Take 100 mg by mouth 2 times a day.     • naproxen (NAPROSYN) 500 MG Tab Take 500 mg by mouth 2 times a day, with meals.     • divalproex (DEPAKOTE) 500 MG Tablet Delayed Response Take 1,000 mg by mouth every bedtime.     • lidocaine (LIDODERM) 5 % Patch Apply 1 Patch to skin as directed 1 time daily as needed.       No current facility-administered medications  "for this visit.      He  has a past medical history of Anesthesia; Arthritis; Bowel habit changes; Pain; and Psychiatric problem.    Social History and Family History were reviewed and updated.    ROS   No chest pain, no shortness of breath, no abdominal pain and all other systems were reviewed and are negative.       Objective:     Blood pressure 122/78, pulse 87, temperature 36.9 °C (98.5 °F), height 1.854 m (6' 1\"), weight 114.8 kg (253 lb), SpO2 96 %. Body mass index is 33.38 kg/m².   Physical Exam:  Constitutional: Alert, no distress.  Skin: Warm, dry, good turgor, no rashes in visible areas.  Eye: Equal, round and reactive, conjunctiva clear, lids normal.  ENMT: Lips without lesions, good dentition, oropharynx clear.  Neck: Trachea midline, no masses.   Lymph: No cervical or supraclavicular lymphadenopathy  Respiratory: Unlabored respiratory effort, lungs appear clear, no wheezes.  Cardiovascular: Regular rate and rhythm  Psych: Alert and oriented x3, normal affect and mood.        Assessment and Plan:   The following treatment plan was discussed    1. Chronic neck and back pain  Chronic condition. Stable.  reviewed. Medication is appropriate. Renewed oxycodone with a 30 day supply and one refill. Advised to follow-up and make an appointment with his PCP for refills.  - oxyCODONE immediate release (ROXICODONE) 10 MG immediate release tablet; Take 1 Tab by mouth every 6 hours as needed for Moderate Pain for up to 30 days.  Dispense: 120 Tab; Refill: 0    2. Obesity (BMI 30-39.9)  Chronic condition. Improving. We'll monitor.  - Patient identified as having weight management issue.  Appropriate orders and counseling given.    3.  H/O meniscectomy of right knee  Chronic condition. [facility]. DMV placard form was completed and given 6 month disability. Follow-up with PCP for renewal.  - oxyCODONE immediate release (ROXICODONE) 10 MG immediate release tablet; Take 1 Tab by mouth " every 6 hours as needed for Moderate Pain for up to 30 days.  Dispense: 120 Tab; Refill: 0      Followup: Return in about 2 months (around 8/29/2018), or if symptoms worsen or fail to improve, for with PCP for refills.    Please note that this dictation was created using voice recognition software. I have made every reasonable attempt to correct obvious errors, but I expect that there are errors of grammar and possibly content that I did not discover before finalizing the note.

## 2018-06-29 NOTE — ASSESSMENT & PLAN NOTE
He's been doing better with exercising and using the stairs. Also eating healthier. His lost about 10 pounds.

## 2018-06-29 NOTE — ASSESSMENT & PLAN NOTE
This is a 33-year-old male that unfortunately missed his appointment yesterday with his PCP. Has chronic neck and back pain as well as knee pain on the right side. [facility] but [facility] no [facility]. Today brought in a disc for his previous medical records. He also has had disability placards given new in the past for his chronic concerns with his joints. He is requesting a form to be completed. He is out of his narcotics. I did provide in the last prescription as he saw me. Initially though he was seen by Dr. Huston. Plans to see him again for refills.

## 2018-06-29 NOTE — TELEPHONE ENCOUNTER
Pt came in office today for Ov, brought in a CD with all  Medical Records enclosed. CD given to Medical Records Dept. All information labelled on CD, PCP and MRN.

## 2018-07-13 ENCOUNTER — OFFICE VISIT (OUTPATIENT)
Dept: MEDICAL GROUP | Facility: MEDICAL CENTER | Age: 33
End: 2018-07-13
Payer: COMMERCIAL

## 2018-07-13 VITALS
HEIGHT: 73 IN | HEART RATE: 77 BPM | WEIGHT: 259 LBS | DIASTOLIC BLOOD PRESSURE: 78 MMHG | OXYGEN SATURATION: 100 % | SYSTOLIC BLOOD PRESSURE: 110 MMHG | BODY MASS INDEX: 34.33 KG/M2 | TEMPERATURE: 98.1 F

## 2018-07-13 DIAGNOSIS — G89.29 CHRONIC LEFT SHOULDER PAIN: ICD-10-CM

## 2018-07-13 DIAGNOSIS — F11.20 OPIOID TYPE DEPENDENCE, CONTINUOUS USE (HCC): ICD-10-CM

## 2018-07-13 DIAGNOSIS — M54.9 CHRONIC NECK AND BACK PAIN: ICD-10-CM

## 2018-07-13 DIAGNOSIS — M23.300 LATERAL MENISCUS DERANGEMENT, RIGHT: ICD-10-CM

## 2018-07-13 DIAGNOSIS — Z98.890 H/O MENISCECTOMY OF RIGHT KNEE: ICD-10-CM

## 2018-07-13 DIAGNOSIS — Z79.891 CHRONIC USE OF OPIATE DRUGS THERAPEUTIC PURPOSES: ICD-10-CM

## 2018-07-13 DIAGNOSIS — M54.2 CHRONIC NECK AND BACK PAIN: ICD-10-CM

## 2018-07-13 DIAGNOSIS — E66.9 OBESITY (BMI 30-39.9): ICD-10-CM

## 2018-07-13 DIAGNOSIS — M25.512 CHRONIC LEFT SHOULDER PAIN: ICD-10-CM

## 2018-07-13 DIAGNOSIS — G89.29 CHRONIC NECK AND BACK PAIN: ICD-10-CM

## 2018-07-13 PROCEDURE — 99214 OFFICE O/P EST MOD 30 MIN: CPT | Performed by: FAMILY MEDICINE

## 2018-07-13 RX ORDER — OXYCODONE HYDROCHLORIDE 10 MG/1
10 TABLET ORAL EVERY 8 HOURS PRN
Qty: 90 TAB | Refills: 0 | Status: SHIPPED | OUTPATIENT
Start: 2018-08-28 | End: 2018-09-14 | Stop reason: SDUPTHER

## 2018-07-13 NOTE — ASSESSMENT & PLAN NOTE
Chronic, stable, well-controlled.  Patient is working on decreasing his dependence on the oxycodone.  He is currently taking the oxycodone as 10 mg p.o. twice daily, sometimes 3 times daily.  Patient is working to decrease weight and has found that this has allowed him to have less pain, particularly in his knees.  Patient is also engaging with other forms of exercise to improve function and decrease pain in his neck, back, and shoulder.    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    ROS is NEGATIVE for bilateral upper extremity nor BLE radicular pain, saddle paresthesias, bowel or bladder incontinence, gait instability

## 2018-07-13 NOTE — ASSESSMENT & PLAN NOTE
Chronic, stable, well-controlled.  Please see notes from same date of service 7/13/2018 regarding chronic use of opiate drugs for therapeutic purposes.

## 2018-07-13 NOTE — PROGRESS NOTES
Subjective:   Chief Complaint/History of Present Illness:  Danie Pretty is a 33 y.o. male established patient who presents today to discuss medical problems as listed below    Diagnoses of Chronic use of opiate drugs therapeutic purposes, Opioid type dependence, continuous use (HCC), Lateral meniscus derangement, right, H/O meniscectomy of right knee, Chronic left shoulder pain, Chronic neck and back pain, and Obesity (BMI 30-39.9) were pertinent to this visit.    Chronic use of opiate drugs therapeutic purposes  Chronic, stable, well-controlled.  Patient is working on decreasing his dependence on the oxycodone.  He is currently taking the oxycodone as 10 mg p.o. twice daily, sometimes 3 times daily.  Patient is working to decrease weight and has found that this has allowed him to have less pain, particularly in his knees.  Patient is also engaging with other forms of exercise to improve function and decrease pain in his neck, back, and shoulder.    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    ROS is NEGATIVE for bilateral upper extremity nor BLE radicular pain, saddle paresthesias, bowel or bladder incontinence, gait instability     Opioid type dependence, continuous use (HCC)  Chronic, stable, well-controlled.  Please see notes from same date of service 7/13/2018 regarding chronic use of opiate drugs for therapeutic purposes.    Lateral meniscus derangement, right  Acute exacerbation of chronic condition, uncontrolled, patient is requesting an evaluation today of physical exam, as well as possible referral to orthopedic surgeon.  We discussed that orthopedic surgery could benefit him if his issue is a lateral meniscus tear.  However, if the issues that he has diffuse inflammation in his right knee joint secondary to not having a medial meniscus, he may need to just use knee support consistently.    Patient did have a question  regarding whether it is possible to have a meniscus implants.  I informed him that he could address this question to the orthopedic surgeon.    H/O meniscectomy of right knee -- medial meniscus  Chronic, uncontrolled, please see notes from same date of service 7/13/2018 regarding lateral meniscus derangement, right    Chronic left shoulder pain  Chronic, stable, well-controlled.  Please see notes from same date of service 7/13/2018 regarding chronic use of opiate drugs for therapeutic purposes.    Chronic neck and back pain  Chronic, stable, well-controlled.  Please see notes from same date of service 7/13/2018 regarding chronic use of opiate drugs for therapeutic purposes.    Obesity (BMI 30-39.9)  Chronic, uncontrolled, however improving.  Patient has been making healthy lifestyle changes, watching what he eats, as well as increasing cardiovascular status.  Patient has been able to lose approximately 20 pounds since our last visit in January 2018.    ROS is NEGATIVE for: cold or heat intolerance, anxiety/depression, chest pain/pressure, palpitations, hair thickening/coarsening/falling out/thinning, skin changes, diarrhea/constipation, unexpected weight change.     ROS is NEGATIVE for blurred vision, polydipsia, polyuria, diaphoresis, palpitations, fatigue, irritability, flank pain, BLE paresthesias.       Patient Active Problem List    Diagnosis Date Noted   • Lateral meniscus derangement, right 07/13/2018   • Opioid type dependence, continuous use ([facility]) 05/14/2018   • Obesity (BMI 30-39.9) 01/25/2018   • Chronic left shoulder pain 01/25/2018   • H/O meniscectomy of right knee -- medial meniscus 01/25/2018   • Chronic use of opiate drugs therapeutic purposes 01/25/2018   • Chronic neck and back pain 03/07/2017       Additional History:   Allergies:    Patient has no known allergies.     Current Medications:     Current Outpatient Prescriptions   Medication Sig Dispense Refill   • [START ON 8/28/2018] oxyCODONE  "immediate release (ROXICODONE) 10 MG immediate release tablet Take 1 Tab by mouth every 8 hours as needed for Moderate Pain for up to 30 days. 90 Tab 0   • acetaminophen (TYLENOL) 500 MG Tab Take 500-1,000 mg by mouth every 6 hours as needed.     • Albuterol Sulfate 108 (90 Base) MCG/ACT AEROSOL POWDER, BREATH ACTIVATED Inhale  by mouth.     • buPROPion (WELLBUTRIN) 100 MG Tab Take 100 mg by mouth 2 times a day.     • naproxen (NAPROSYN) 500 MG Tab Take 500 mg by mouth 2 times a day, with meals.     • divalproex (DEPAKOTE) 500 MG Tablet Delayed Response Take 1,000 mg by mouth every bedtime.     • lidocaine (LIDODERM) 5 % Patch Apply 1 Patch to skin as directed 1 time daily as needed.       No current facility-administered medications for this visit.         Social History:     Social History   Substance Use Topics   • Smoking status: Former Smoker     Packs/day: 1.00     Years: 13.00     Types: Cigarettes     Quit date: 1/1/2015   • Smokeless tobacco: Former User     Types: Chew   • Alcohol use Yes      Comment: Rarely drinks alcohol. Pt chewed tabacco years ago didn't chew tabacco every day.       ROS:     - NOTE: All other systems reviewed and are negative, except as in HPI.     Objective:   Physical Exam:    Vitals: Blood pressure 110/78, pulse 77, temperature 36.7 °C (98.1 °F), height 1.854 m (6' 1\"), weight 117.5 kg (259 lb), SpO2 100 %.   BMI: Body mass index is 34.17 kg/m².   General/Constitutional: Vitals as above, Well nourished, well developed male in no acute distress   Head/Eyes: Head is grossly normal & atraumatic, bilateral conjunctivae clear and not injected, bilateral EOMI, bilateral PERRL   ENT: Bilateral external ears grossly normal in appearance, Hearing grossly intact, External nares normal in appearance and without discharge/bleeding   Respiratory: No respiratory distress, bilateral lungs are clear to ausculation in all lung fields (anterior/lateral/posterior), no " wheezing/rhonchi/rales   Cardiovascular: Regular rate and rhythm without murmur/gallops/rubs, distal pulses are intact and equal bilaterally (radial, posterior tibial), no bilateral lower extremity edema   MSK: positive exam for right knee pain to palpation of bilateral joint spaces as well as increased tenderness to both valgus and varus stress on the right knee (varus more than valgus), tenderness palpation of medial joint space and medial collateral ligament of her left knee, otherwise negative exam of bilateral knees including (anterior/posterior drawer, patellar tendon tenderness to compression, quadriceps tendon tenderness to compression, tenderness to direct palpation of patella, tenderness to direct palpation of lateral collateral ligaments, patellar mobility), Gait grossly normal & not antalgic   Integumentary: No apparent rashes   Psych: Judgment grossly appropriate, no apparent depression/anxiety    Health Maintenance:     - Urine drug screen up-to-date    Imaging/Labs:     - No other labs reviewed today    Assessment and Plan:   1. Chronic use of opiate drugs therapeutic purposes  2. Opioid type dependence, continuous use (HCC)  Chronic, uncontrolled, patient with multiple musculoskeletal causes her pain.  Patient has enough refills for the next 2 months, therefore I will give her a refill for the third month, beginning on oh 8/2 8.  Therefore, patient is back to clinic in mid September.   - oxyCODONE immediate release (ROXICODONE) 10 MG immediate release tablet; Take 1 Tab by mouth every 8 hours as needed for Moderate Pain for up to 30 days.  Dispense: 90 Tab; Refill: 0    3. Lateral meniscus derangement, right  4. H/O meniscectomy of right knee  #3 is new problem, uncontrolled, likely due to compensation for #4.  Referral to orthopedics for further evaluation and management, discussion of whether patient could have a meniscus implant versus intra-articular injection.   - REFERRAL TO ORTHOPEDICS   -  oxyCODONE immediate release (ROXICODONE) 10 MG immediate release tablet; Take 1 Tab by mouth every 8 hours as needed for Moderate Pain for up to 30 days.  Dispense: 90 Tab; Refill: 0    5. Chronic left shoulder pain  6. Chronic neck and back pain  Chronic, stable, well-controlled, continuing as needed use of medications as refilled below   - oxyCODONE immediate release (ROXICODONE) 10 MG immediate release tablet; Take 1 Tab by mouth every 8 hours as needed for Moderate Pain for up to 30 days.  Dispense: 90 Tab; Refill: 0    7. Obesity (BMI 30-39.9)  Uncontrolled, improving.  Patient encouraged to continue with lifestyle changes.        RTC: In approximately 2 months for pain management.    PLEASE NOTE: This dictation was created using voice recognition software. I have made every reasonable attempt to correct obvious errors, but I expect that there are errors of grammar and possibly content that I did not discover before finalizing the note.

## 2018-07-13 NOTE — ASSESSMENT & PLAN NOTE
Acute exacerbation of chronic condition, uncontrolled, patient is requesting an evaluation today of physical exam, as well as possible referral to orthopedic surgeon.  We discussed that orthopedic surgery could benefit him if his issue is a lateral meniscus tear.  However, if the issues that he has diffuse inflammation in his right knee joint secondary to not having a medial meniscus, he may need to just use knee support consistently.    Patient did have a question regarding whether it is possible to have a meniscus implants.  I informed him that he could address this question to the orthopedic surgeon.

## 2018-07-13 NOTE — ASSESSMENT & PLAN NOTE
Chronic, uncontrolled, please see notes from same date of service 7/13/2018 regarding lateral meniscus derangement, right

## 2018-07-13 NOTE — ASSESSMENT & PLAN NOTE
Chronic, uncontrolled, however improving.  Patient has been making healthy lifestyle changes, watching what he eats, as well as increasing cardiovascular status.  Patient has been able to lose approximately 20 pounds since our last visit in January 2018.    ROS is NEGATIVE for: cold or heat intolerance, anxiety/depression, chest pain/pressure, palpitations, hair thickening/coarsening/falling out/thinning, skin changes, diarrhea/constipation, unexpected weight change.     ROS is NEGATIVE for blurred vision, polydipsia, polyuria, diaphoresis, palpitations, fatigue, irritability, flank pain, BLE paresthesias.

## 2018-09-14 ENCOUNTER — OFFICE VISIT (OUTPATIENT)
Dept: MEDICAL GROUP | Facility: MEDICAL CENTER | Age: 33
End: 2018-09-14
Payer: COMMERCIAL

## 2018-09-14 VITALS
HEIGHT: 73 IN | TEMPERATURE: 98.4 F | SYSTOLIC BLOOD PRESSURE: 112 MMHG | WEIGHT: 255.4 LBS | OXYGEN SATURATION: 95 % | DIASTOLIC BLOOD PRESSURE: 78 MMHG | HEART RATE: 72 BPM | BODY MASS INDEX: 33.85 KG/M2

## 2018-09-14 DIAGNOSIS — E66.9 OBESITY (BMI 30-39.9): ICD-10-CM

## 2018-09-14 DIAGNOSIS — G89.29 CHRONIC LEFT SHOULDER PAIN: ICD-10-CM

## 2018-09-14 DIAGNOSIS — Z98.890 H/O MENISCECTOMY OF RIGHT KNEE: ICD-10-CM

## 2018-09-14 DIAGNOSIS — M54.9 CHRONIC NECK AND BACK PAIN: ICD-10-CM

## 2018-09-14 DIAGNOSIS — M25.512 CHRONIC LEFT SHOULDER PAIN: ICD-10-CM

## 2018-09-14 DIAGNOSIS — Z79.891 CHRONIC USE OF OPIATE DRUGS THERAPEUTIC PURPOSES: ICD-10-CM

## 2018-09-14 DIAGNOSIS — F43.12 CHRONIC POST-TRAUMATIC STRESS DISORDER (PTSD): ICD-10-CM

## 2018-09-14 DIAGNOSIS — G89.29 CHRONIC NECK AND BACK PAIN: ICD-10-CM

## 2018-09-14 DIAGNOSIS — F11.20 OPIOID TYPE DEPENDENCE, CONTINUOUS USE (HCC): ICD-10-CM

## 2018-09-14 DIAGNOSIS — M54.2 CHRONIC NECK AND BACK PAIN: ICD-10-CM

## 2018-09-14 DIAGNOSIS — M23.300 LATERAL MENISCUS DERANGEMENT, RIGHT: ICD-10-CM

## 2018-09-14 DIAGNOSIS — R55 SYNCOPE AND COLLAPSE: ICD-10-CM

## 2018-09-14 PROCEDURE — 99215 OFFICE O/P EST HI 40 MIN: CPT | Performed by: FAMILY MEDICINE

## 2018-09-14 RX ORDER — METHOCARBAMOL 500 MG/1
1000 TABLET, FILM COATED ORAL 4 TIMES DAILY
COMMUNITY
End: 2019-01-14 | Stop reason: SDUPTHER

## 2018-09-14 RX ORDER — PREGABALIN 100 MG/1
100 CAPSULE ORAL 2 TIMES DAILY
COMMUNITY

## 2018-09-14 RX ORDER — OXYCODONE HYDROCHLORIDE 10 MG/1
10 TABLET ORAL EVERY 6 HOURS PRN
Qty: 120 TAB | Refills: 0 | Status: SHIPPED | OUTPATIENT
Start: 2018-09-14 | End: 2018-09-14 | Stop reason: SDUPTHER

## 2018-09-14 RX ORDER — OXYCODONE HYDROCHLORIDE 10 MG/1
10 TABLET ORAL EVERY 6 HOURS PRN
Qty: 120 TAB | Refills: 0 | Status: SHIPPED | OUTPATIENT
Start: 2018-10-14 | End: 2018-09-14 | Stop reason: SDUPTHER

## 2018-09-14 RX ORDER — OXYCODONE HYDROCHLORIDE 10 MG/1
10 TABLET ORAL EVERY 6 HOURS PRN
Qty: 120 TAB | Refills: 0 | Status: SHIPPED | OUTPATIENT
Start: 2018-11-13 | End: 2018-10-19 | Stop reason: SDUPTHER

## 2018-09-14 NOTE — ASSESSMENT & PLAN NOTE
Chronic, stable, well-controlled, taking medication as directed.     Patient takes bupropion as directed.

## 2018-09-14 NOTE — ASSESSMENT & PLAN NOTE
Chronic, stable, well-controlled, but can cause him to have R knee pain with increased ambulation or other pressure placed onto his knee.

## 2018-09-14 NOTE — ASSESSMENT & PLAN NOTE
Chronic, uncontrolled, patient to work on weight loss over time.    ROS is NEGATIVE for: cold or heat intolerance, anxiety/depression, chest pain/pressure, palpitations, hair thickening/coarsening/falling out/thinning, skin changes, diarrhea/constipation, unexpected weight change.    ROS is NEGATIVE for blurred vision, polydipsia, polyuria, diaphoresis, palpitations, fatigue, irritability, flank pain, BLE paresthesias.

## 2018-09-14 NOTE — ASSESSMENT & PLAN NOTE
Please see notes from same date of service 09/14/18 re: chronic use of opiate drugs for therapeutic purposes.

## 2018-09-14 NOTE — PROGRESS NOTES
Subjective:   Chief Complaint/History of Present Illness:  Danie Pretty is a 33 y.o. male established patient who presents today to discuss medical problems as listed below    Diagnoses of Chronic use of opiate drugs therapeutic purposes, Chronic neck and back pain, Chronic left shoulder pain, Lateral meniscus derangement, right, Opioid type dependence, continuous use (HCC), Syncope and collapse, Chronic post-traumatic stress disorder (PTSD), Obesity (BMI 30-39.9), and H/O meniscectomy of right knee were pertinent to this visit.    Chronic use of opiate drugs therapeutic purposes  Chronic, uncontrolled, patient states that he is having exacerbations of his pains due to stress (from being placed on leave by his HR -- see notes from same date of service 09/14/18 re: Syncope and collapse), and doing DIY projects around his house.  Patient is working on decreasing weight, has decreased ~4lbs since our last visit.    ROS is NEGATIVE for BUE nor BLE radicular pain, saddle paresthesias, bowel or bladder incontinence, gait instability    Chronic neck and back pain  Please see notes from same date of service 09/14/18 re: chronic use of opiate drugs for therapeutic purposes.    Chronic left shoulder pain  Please see notes from same date of service 09/14/18 re: chronic use of opiate drugs for therapeutic purposes.    (HCC) Opioid type dependence, continuous use  Please see notes from same date of service 09/14/18 re: chronic use of opiate drugs for therapeutic purposes.    Lateral meniscus derangement, right  Please see notes from same date of service 09/14/18 re: chronic use of opiate drugs for therapeutic purposes.    Syncope and collapse  New problem for medical evaluation, uncontrolled, patient states that he has been having intermittent syncopal episodes over the last 2months [facility] [place]  He is having ongoing evaluation/management primarily by Dr. Campa, [facility] [place].    Apparently his Primary Care Manager is either unable or unwilling to fill out his FMLA paperwork and short-term disabillity paperwork, and is thus requesting that I please fill out this paperwork for him.    Patient states that his intermittent episodes of dizziness do not seem to be related to any hearing loss or tinnitus, and not related to any rapid changing of head position.  Therefore, he continues to undergo evaluation workup by his neurologist, Dr. Campa.    Patient denies use of illicit drugs, denies binge alcohol drinking, denies traumatic brain injury recently.  However, patient has been in the .    ROS is NEGATIVE for confusion, altered mentation, word finding difficulty, memory loss, diplopia, visual scotomas, facial droop, dysarthria, dysphagia, hemiplegia, gait instability, new/abnormal paresthesias/numbness.     Chronic post-traumatic stress disorder (PTSD)  Chronic, stable, well-controlled, taking medication as directed.     Patient takes bupropion as directed.    Obesity (BMI 30-39.9)  Chronic, uncontrolled, patient to work on weight loss over time.    ROS is NEGATIVE for: cold or heat intolerance, anxiety/depression, chest pain/pressure, palpitations, hair thickening/coarsening/falling out/thinning, skin changes, diarrhea/constipation, unexpected weight change.    ROS is NEGATIVE for blurred vision, polydipsia, polyuria, diaphoresis, palpitations, fatigue, irritability, flank pain, BLE paresthesias.    H/O meniscectomy of right knee -- medial meniscus  Chronic, stable, well-controlled, but can cause him to have R knee pain with increased ambulation or other pressure placed onto his knee.      Patient Active Problem List    Diagnosis Date Noted   • Syncope and collapse 09/14/2018   • Chronic post-traumatic stress disorder (PTSD) 09/14/2018   • Lateral meniscus derangement, right 07/13/2018   • (Formerly [facility]) Opioid type dependence, continuous use 05/14/2018   • Obesity (BMI  "30-39.9) 01/25/2018   • Chronic left shoulder pain 01/25/2018   • H/O meniscectomy of right knee -- medial meniscus 01/25/2018   • Chronic use of opiate drugs therapeutic purposes 01/25/2018   • Chronic neck and back pain 03/07/2017       Additional History:   Allergies:    Patient has no known allergies.     Current Medications:     Current Outpatient Prescriptions   Medication Sig Dispense Refill   • pregabalin (LYRICA) 100 MG Cap Take 100 mg by mouth 2 times a day.     • methocarbamol (ROBAXIN) 500 MG Tab Take 1,000 mg by mouth 4 times a day.     • [START ON 11/13/2018] oxyCODONE immediate release (ROXICODONE) 10 MG immediate release tablet Take 1 Tab by mouth every 6 hours as needed for Moderate Pain or Severe Pain for up to 30 days. 120 Tab 0   • acetaminophen (TYLENOL) 500 MG Tab Take 500-1,000 mg by mouth every 6 hours as needed.     • Albuterol Sulfate 108 (90 Base) MCG/ACT AEROSOL POWDER, BREATH ACTIVATED Inhale  by mouth.     • buPROPion (WELLBUTRIN) 100 MG Tab Take 100 mg by mouth 2 times a day.     • naproxen (NAPROSYN) 500 MG Tab Take 500 mg by mouth 2 times a day, with meals.     • divalproex (DEPAKOTE) 500 MG Tablet Delayed Response Take 1,000 mg by mouth every bedtime.     • lidocaine (LIDODERM) 5 % Patch Apply 1 Patch to skin as directed 1 time daily as needed.       No current facility-administered medications for this visit.         Social History:     Social History   Substance Use Topics   • Smoking status: Former Smoker     Packs/day: 1.00     Years: 13.00     Types: Cigarettes     Quit date: 1/1/2015   • Smokeless tobacco: Former User     Types: Chew   • Alcohol use Yes      Comment: Rarely drinks alcohol. Pt chewed tabacco years ago didn't chew tabacco every day.       ROS:     - NOTE: All other systems reviewed and are negative, except as in HPI.     Objective:   Physical Exam:    Vitals: Blood pressure 112/78, pulse 72, temperature 36.9 °C (98.4 °F), height 1.854 m (6' 0.99\"), weight 115.8 " kg (255 lb 6.4 oz), SpO2 95 %.   BMI: Body mass index is 33.7 kg/m².   General/Constitutional: Vitals as above, Well nourished, well developed male in no acute distress   Head/Eyes: Head is grossly normal & atraumatic, bilateral conjunctivae clear and not injected, bilateral EOMI, bilateral PERRL   ENT: Bilateral external ears grossly normal in appearance, Hearing grossly intact, External nares normal in appearance and without discharge/bleeding   Respiratory: No respiratory distress, bilateral lungs are clear to ausculation in all lung fields (anterior/lateral/posterior), no wheezing/rhonchi/rales   Cardiovascular: Regular rate and rhythm without murmur/gallops/rubs, distal pulses are intact and equal bilaterally (radial, posterior tibial), no bilateral lower extremity edema   MSK: Positive right knee pain on palpation of medial and lateral joint spaces as well as increased tenderness to both valgus and varus stress on the right knee (varus more than valgus), tenderness palpation of medial joint space and medial collateral ligament of his left knee, pain on passive and range of motion , Gait grossly normal & not antalgic   Integumentary: No apparent rashes   Psych: Judgment grossly appropriate, no apparent depression/anxiety    Health Maintenance:     - Not reviewed at this visit    Imaging/Labs:     - No new labs to review    Assessment and Plan:   1. Chronic use of opiate drugs therapeutic purposes  2. Chronic neck and back pain  3. Chronic left shoulder pain  4. Lateral meniscus derangement, right  5. Opioid type dependence, continuous use (HCC)  9. H/O meniscectomy of right knee  Chronic, stable, well-controlled.  Continue taking medication as directed.    - oxyCODONE immediate release (ROXICODONE) 10 MG immediate release tablet; Take 1 Tab by mouth every 6 hours as needed for Moderate Pain or Severe Pain for up to 30 days.  Dispense: 120 Tab; Refill: 0   - REFERRAL TO [facility]OP[facility]IC    6. Syncope and  collapse  New problem for my medical evaluation, uncontrolled, however patient has been having [facility], in particular [facility] neurologist.  Patient to [facility] at the [facility].  However, I have filled out FMLA & short-term disability paperwork for the patient to submit to [facility]    7. Chronic post-traumatic stress disorder (PTSD)  Chronic, stable, well-controlled.  Taking medications as directed.      8. Obesity (BMI 30-39.9)  Chronic, uncontrolled, patient to work on lifestyle changes to achieve weight loss, over time.    RTC: In 1month for FMLA Re-evaluation.    NOTE: A total of 40minutes was spent in direct face-to-face time with the patient, of which over 50% of the time was spent in counseling and/or coordination of care as discussed above.    PLEASE NOTE: This dictation was created using voice recognition software. I have made every reasonable attempt to correct obvious errors, but I expect that there are errors of grammar and possibly content that I did not discover before finalizing the note.

## 2018-09-14 NOTE — ASSESSMENT & PLAN NOTE
New problem for medical evaluation, uncontrolled, patient states that he has been having intermittent syncopal episodes over the last 2months with [facility] here [facility] [place].  He is having ongoing evaluation/management primarily by Dr. Campa, [facility] neurologist [facility] here in [place].    Apparently his Primary Care Manager is either unable or unwilling to fill out his FMLA paperwork and short-term disabillity paperwork, and is thus requesting that I please fill out this paperwork for him.    Patient states that his intermittent episodes of dizziness do not seem to be related to any hearing loss or tinnitus, and not related to any rapid changing of head position.  Therefore, he continues to undergo evaluation workup by his neurologist, Dr. Campa.    Patient denies use of illicit drugs, denies binge alcohol drinking, denies traumatic brain injury recently.  However, patient has been in the .    ROS is NEGATIVE for confusion, altered mentation, word finding difficulty, memory loss, diplopia, visual scotomas, facial droop, dysarthria, dysphagia, hemiplegia, gait instability, new/abnormal paresthesias/numbness.

## 2018-09-14 NOTE — ASSESSMENT & PLAN NOTE
Chronic, uncontrolled, patient states that he is having exacerbations of his pains due to stress (from being placed on leave by his HR -- see notes from same date of service 09/14/18 re: Syncope and collapse), and doing DIY projects around his house.  Patient is working on decreasing weight, has decreased ~4lbs since our last visit.    ROS is NEGATIVE for BUE nor BLE radicular pain, saddle paresthesias, bowel or bladder incontinence, gait instability

## 2018-10-19 ENCOUNTER — OFFICE VISIT (OUTPATIENT)
Dept: MEDICAL GROUP | Facility: MEDICAL CENTER | Age: 33
End: 2018-10-19
Payer: COMMERCIAL

## 2018-10-19 VITALS
WEIGHT: 251.4 LBS | HEART RATE: 64 BPM | OXYGEN SATURATION: 98 % | SYSTOLIC BLOOD PRESSURE: 120 MMHG | HEIGHT: 73 IN | TEMPERATURE: 98.2 F | BODY MASS INDEX: 33.32 KG/M2 | DIASTOLIC BLOOD PRESSURE: 60 MMHG

## 2018-10-19 DIAGNOSIS — M23.300 LATERAL MENISCUS DERANGEMENT, RIGHT: ICD-10-CM

## 2018-10-19 DIAGNOSIS — M54.2 CHRONIC NECK AND BACK PAIN: ICD-10-CM

## 2018-10-19 DIAGNOSIS — G89.29 CHRONIC LEFT SHOULDER PAIN: ICD-10-CM

## 2018-10-19 DIAGNOSIS — Z79.891 CHRONIC USE OF OPIATE DRUGS THERAPEUTIC PURPOSES: ICD-10-CM

## 2018-10-19 DIAGNOSIS — Z98.890 H/O MENISCECTOMY OF RIGHT KNEE: ICD-10-CM

## 2018-10-19 DIAGNOSIS — M54.9 CHRONIC NECK AND BACK PAIN: ICD-10-CM

## 2018-10-19 DIAGNOSIS — F11.20 OPIOID TYPE DEPENDENCE, CONTINUOUS USE (HCC): ICD-10-CM

## 2018-10-19 DIAGNOSIS — M25.512 CHRONIC LEFT SHOULDER PAIN: ICD-10-CM

## 2018-10-19 DIAGNOSIS — G89.29 CHRONIC NECK AND BACK PAIN: ICD-10-CM

## 2018-10-19 PROCEDURE — 99214 OFFICE O/P EST MOD 30 MIN: CPT | Performed by: FAMILY MEDICINE

## 2018-10-19 RX ORDER — OXYCODONE HYDROCHLORIDE 10 MG/1
10 TABLET ORAL EVERY 6 HOURS PRN
Qty: 120 TAB | Refills: 0 | Status: SHIPPED | OUTPATIENT
Start: 2018-10-19 | End: 2018-10-19 | Stop reason: SDUPTHER

## 2018-10-19 RX ORDER — OXYCODONE HYDROCHLORIDE 10 MG/1
10 TABLET ORAL EVERY 6 HOURS PRN
Qty: 120 TAB | Refills: 0 | Status: SHIPPED | OUTPATIENT
Start: 2018-11-18 | End: 2018-10-19 | Stop reason: SDUPTHER

## 2018-10-19 RX ORDER — OXYCODONE HYDROCHLORIDE 10 MG/1
10 TABLET ORAL EVERY 6 HOURS PRN
Qty: 120 TAB | Refills: 0 | Status: SHIPPED | OUTPATIENT
Start: 2018-12-18 | End: 2018-11-12 | Stop reason: SDUPTHER

## 2018-10-19 NOTE — LETTER
October 19, 2018         Patient: Danie Pretty   YOB: 1985   Date of Visit: 10/19/2018           To Whom it May Concern:     Danie Pretty was seen in my clinic on 10/19/2018.  My medical recommendation is for the patient to continue to be on work leave at present time.  I will submit the appropriate paperwork to certify why he needs this time off, and will have it faxed to the appropriate department in your organization as soon as possible.     If you have any questions or concerns, please don't hesitate to call.        Sincerely,           Conrad Huston M.D.  Electronically Signed

## 2018-10-22 NOTE — ASSESSMENT & PLAN NOTE
Chronic, stable, well-controlled, taking medication as directed.  Patient presents today for re-evaluation of his various musculoskeletal pains, follow-up on FMLA vs. Disability paperwork.  Patient states that pains are relatively uncontrolled, but otherwise unchanged from our last visit in 09/2018.  Therefore, patient without improvement nor declination in function.    FMLA & Disability paperwork are somewhat related to pain, but more so related to the syncopal episodes he has been having, which are being evaluated by [facility]rolog[facility], Dr[facility] Campa.    ROS is NEGATIVE for respiratory depression, cognitive slowing, constipation, cyanotic skin changes.     ROS is NEGATIVE for confusion, altered mentation, word finding difficulty, memory loss, diplopia, visual scotomas, facial droop, dysarthria, dysphagia, hemiplegia, gait instability, new/abnormal paresthesias/numbness.

## 2018-10-22 NOTE — PROGRESS NOTES
sfdSubjective:   Chief Complaint/History of Present Illness:  Danie Pretty is a 33 y.o. male established patient who presents today to discuss medical problems as listed below    Diagnoses of Chronic use of opiate drugs therapeutic purposes, Opioid type dependence, continuous use (HCC), Chronic left shoulder pain, Chronic neck and back pain, Lateral meniscus derangement, right, and H/O meniscectomy of right knee were pertinent to this visit.    Chronic use of opiate drugs therapeutic purposes  Chronic, stable, well-controlled, taking medication as directed.  Patient presents today for re-evaluation of his various musculoskeletal pains, follow-up on FMLA vs. Disability paperwork.  Patient states that pains are relatively uncontrolled, but otherwise unchanged from our last visit in 09/2018.  Therefore, patient without improvement nor declination in function.    FMLA & Disability paperwork are somewhat related to pain, but more so related to the syncopal episodes he has been having, which are being evaluated by [facility], Dr. Camap.    ROS is NEGATIVE for respiratory depression, cognitive slowing, constipation, cyanotic skin changes.     ROS is NEGATIVE for confusion, altered mentation, word finding difficulty, memory loss, diplopia, visual scotomas, facial droop, dysarthria, dysphagia, hemiplegia, gait instability, new/abnormal paresthesias/numbness.    (HCC) Opioid type dependence, continuous use  Please see notes from same date of service 10/19/18 re: Chronic Use of Opiate Drugs for Therapeutic purposes.    Chronic left shoulder pain  Please see notes from same date of service 10/19/18 re: Chronic Use of Opiate Drugs for Therapeutic purposes.    Chronic neck and back pain  Please see notes from same date of service 10/19/18 re: Chronic Use of Opiate Drugs for Therapeutic purposes.    Lateral meniscus derangement, right  Please see notes from same date of service 10/19/18 re: Chronic Use of  Opiate Drugs for Therapeutic purposes.      Patient Active Problem List    Diagnosis Date Noted   • Syncope and collapse 09/14/2018   • Chronic post-traumatic stress disorder (PTSD) 09/14/2018   • Lateral meniscus derangement, right 07/13/2018   • (Formerly [facility]) Opioid type dependence, continuous use 05/14/2018   • Obesity (BMI 30-39.9) 01/25/2018   • Chronic left shoulder pain 01/25/2018   • H/O meniscectomy of right knee -- medial meniscus 01/25/2018   • Chronic use of opiate drugs therapeutic purposes 01/25/2018   • Chronic neck and back pain 03/07/2017       Additional History:   Allergies:    Patient has no known allergies.     Current Medications:     Current Outpatient Prescriptions   Medication Sig Dispense Refill   • [START ON 12/18/2018] oxyCODONE immediate release (ROXICODONE) 10 MG immediate release tablet Take 1 Tab by mouth every 6 hours as needed for Moderate Pain or Severe Pain for up to 30 days. 120 Tab 0   • pregabalin (LYRICA) 100 MG Cap Take 100 mg by mouth 2 times a day.     • methocarbamol (ROBAXIN) 500 MG Tab Take 1,000 mg by mouth 4 times a day.     • acetaminophen (TYLENOL) 500 MG Tab Take 500-1,000 mg by mouth every 6 hours as needed.     • Albuterol Sulfate 108 (90 Base) MCG/ACT AEROSOL POWDER, BREATH ACTIVATED Inhale  by mouth.     • buPROPion (WELLBUTRIN) 100 MG Tab Take 100 mg by mouth 2 times a day.     • naproxen (NAPROSYN) 500 MG Tab Take 500 mg by mouth 2 times a day, with meals.     • divalproex (DEPAKOTE) 500 MG Tablet Delayed Response Take 1,000 mg by mouth every bedtime.     • lidocaine (LIDODERM) 5 % Patch Apply 1 Patch to skin as directed 1 time daily as needed.       No current facility-administered medications for this visit.         Social History:     Social History   Substance Use Topics   • Smoking status: Former Smoker     Packs/day: 1.00     Years: 13.00     Types: Cigarettes     Quit date: 1/1/2015   • Smokeless tobacco: Former User     Types: Chew   • Alcohol use Yes       "Comment: Rarely drinks alcohol. Pt chewed tabacco years ago didn't chew tabacco every day.       ROS:     - NOTE: All other systems reviewed and are negative, except as in HPI.     Objective:   Physical Exam:    Vitals: Blood pressure 120/60, pulse 64, temperature 36.8 °C (98.2 °F), height 1.854 m (6' 0.99\"), weight 114 kg (251 lb 6.4 oz), SpO2 98 %.   BMI: Body mass index is 33.18 kg/m².   General/Constitutional: Vitals as above, Well nourished, well developed male in no acute distress   Head/Eyes: Head is grossly normal & atraumatic, bilateral conjunctivae clear and not injected, bilateral EOMI, bilateral PERRL   ENT: Bilateral external ears grossly normal in appearance, Hearing grossly intact, External nares normal in appearance and without discharge/bleeding   Respiratory: No respiratory distress, bilateral lungs are clear to ausculation in all lung fields (anterior/lateral/posterior), no wheezing/rhonchi/rales   Cardiovascular: Regular rate and rhythm without murmur/gallops/rubs, distal pulses are intact and equal bilaterally (radial, posterior tibial), no bilateral lower extremity edema   MSK: Positive right knee pain on palpation of medial and lateral joint spaces as well as increased tenderness to both valgus and varus stress on the right knee (varus more than valgus), tenderness palpation of medial joint space and medial collateral ligament of his left knee, pain on passive and range of motion Gait grossly normal & not antalgic   Integumentary: No apparent rashes   Psych: Judgment grossly appropriate, no apparent depression/anxiety    Health Maintenance:     - NarxCheck appropriate    - Otherwise, Not reviewed at this visit    Imaging/Labs:     - Not reviewed at this visit    Assessment and Plan:   1. Chronic use of opiate drugs therapeutic purposes  2. Opioid type dependence, continuous use (HCC)  3. Chronic left shoulder pain  4. Chronic neck and back pain  5. Lateral meniscus derangement, right  6. H/O " meniscectomy of right knee  Chronic, stable, well-controlled.  Continue taking medication as directed.   - oxyCODONE immediate release (ROXICODONE) 10 MG immediate release tablet; Take 1 Tab by mouth every 6 hours as needed for Moderate Pain or Severe Pain for up to 30 days.  Dispense: 120 Tab; Refill: 0        RTC: in 2-3months for pain control.    PLEASE NOTE: This dictation was created using voice recognition software. I have made every reasonable attempt to correct obvious errors, but I expect that there are errors of grammar and possibly content that I did not discover before finalizing the note.

## 2018-10-22 NOTE — ASSESSMENT & PLAN NOTE
Please see notes from same date of service 10/19/18 re: Chronic Use of Opiate Drugs for Therapeutic purposes.

## 2018-11-12 ENCOUNTER — OFFICE VISIT (OUTPATIENT)
Dept: MEDICAL GROUP | Facility: MEDICAL CENTER | Age: 33
End: 2018-11-12
Payer: COMMERCIAL

## 2018-11-12 VITALS
HEART RATE: 88 BPM | BODY MASS INDEX: 33.8 KG/M2 | OXYGEN SATURATION: 100 % | HEIGHT: 73 IN | WEIGHT: 255 LBS | DIASTOLIC BLOOD PRESSURE: 60 MMHG | TEMPERATURE: 98.9 F | SYSTOLIC BLOOD PRESSURE: 100 MMHG

## 2018-11-12 DIAGNOSIS — Z98.890 H/O MENISCECTOMY OF RIGHT KNEE: ICD-10-CM

## 2018-11-12 DIAGNOSIS — G89.29 CHRONIC LEFT SHOULDER PAIN: ICD-10-CM

## 2018-11-12 DIAGNOSIS — F11.20 OPIOID TYPE DEPENDENCE, CONTINUOUS USE (HCC): ICD-10-CM

## 2018-11-12 DIAGNOSIS — L03.116 CELLULITIS OF LEFT LOWER EXTREMITY: ICD-10-CM

## 2018-11-12 DIAGNOSIS — M25.512 CHRONIC LEFT SHOULDER PAIN: ICD-10-CM

## 2018-11-12 DIAGNOSIS — M54.9 CHRONIC NECK AND BACK PAIN: ICD-10-CM

## 2018-11-12 DIAGNOSIS — Z79.891 CHRONIC USE OF OPIATE DRUGS THERAPEUTIC PURPOSES: ICD-10-CM

## 2018-11-12 DIAGNOSIS — M54.2 CHRONIC NECK AND BACK PAIN: ICD-10-CM

## 2018-11-12 DIAGNOSIS — G89.29 CHRONIC NECK AND BACK PAIN: ICD-10-CM

## 2018-11-12 DIAGNOSIS — M23.300 LATERAL MENISCUS DERANGEMENT, RIGHT: ICD-10-CM

## 2018-11-12 PROCEDURE — 99214 OFFICE O/P EST MOD 30 MIN: CPT | Performed by: FAMILY MEDICINE

## 2018-11-12 RX ORDER — DOXYCYCLINE HYCLATE 100 MG
100 TABLET ORAL 2 TIMES DAILY
Qty: 20 TAB | Refills: 0 | Status: SHIPPED | OUTPATIENT
Start: 2018-11-12 | End: 2019-01-14

## 2018-11-12 RX ORDER — OXYCODONE HYDROCHLORIDE 10 MG/1
10 TABLET ORAL EVERY 6 HOURS PRN
Qty: 120 TAB | Refills: 0 | Status: SHIPPED | OUTPATIENT
Start: 2019-01-17 | End: 2019-01-14 | Stop reason: SDUPTHER

## 2018-11-15 NOTE — ASSESSMENT & PLAN NOTE
Chronic, stable, well-controlled.  Please see notes from same date of service 11/12/18 re: chronic use of opiate drugs for therapeutic purposes.

## 2018-11-15 NOTE — ASSESSMENT & PLAN NOTE
Chronic, uncontrolled, mild arthritis of L shoulder, please see notes from same date of service 11/12/2015 regarding chronic use of opiate drugs for therapeutic purposes.

## 2018-11-15 NOTE — ASSESSMENT & PLAN NOTE
Chronic, uncontrolled, bilateral trapezius muscle strain, please see notes from same date of service 11/12/2015 regarding chronic use of opiate drugs for therapeutic purposes.

## 2018-11-15 NOTE — ASSESSMENT & PLAN NOTE
Chronic, uncontrolled, lateral knee joint pain of RLE, please see notes from same date of service 11/12/2015 regarding chronic use of opiate drugs for therapeutic purposes.

## 2018-11-15 NOTE — PROGRESS NOTES
Subjective:   Chief Complaint/History of Present Illness:  Danie Pretty is a 33 y.o. male established patient who presents today to discuss medical problems as listed below    Diagnoses of Cellulitis of left lower extremity, Chronic use of opiate drugs therapeutic purposes, Chronic neck and back pain, Chronic left shoulder pain, Opioid type dependence, continuous use (HCC), Lateral meniscus derangement, right, and H/O meniscectomy of right knee were pertinent to this visit.    Cellulitis of left lower extremity  New problem for medical evaluation, uncontrolled, patient with area of erythematous induration overlying his upper portion of the anterior thigh is approximately 3 cm in diameter, and area of indurated skin extending to a total dimension of approximately 10 cm x 8 cm, without area of palpable fluctuance.      Patient had previously tried topical antibiotics, as well as managing the lesion at home.  However, neither of these treatments at home were sufficient to eliminate the infection.  Therefore, I discussed with patient that he has cellulitis, and that this seems to be treated with antibiotics.      Chronic use of opiate drugs therapeutic purposes  Chronic, stable, well-controlled, taking medication as directed.      Patient states that pains are uncontrolled, states that he continues to hurt in neck, left shoulder, low back.  NO radicular pain at present time,      FMLA & Disability paperwork are somewhat related to pain, but more so related to the syncopal episodes he has been having, which are being evaluated [facility] Campa.    ROS is negative for bilateral upper extremity radicular pain/numbness/weakness.    ROS is NEGATIVE for respiratory depression, cognitive slowing, constipation, cyanotic skin changes.      ROS is NEGATIVE for confusion, altered mentation, word finding difficulty, memory loss, diplopia, visual scotomas, facial droop, dysarthria, dysphagia,  hemiplegia, gait instability, new/abnormal paresthesias/numbness.    Chronic neck and back pain  Chronic, uncontrolled, bilateral trapezius muscle strain, please see notes from same date of service 11/12/2015 regarding chronic use of opiate drugs for therapeutic purposes.    Chronic left shoulder pain  Chronic, uncontrolled, mild arthritis of L shoulder, please see notes from same date of service 11/12/2015 regarding chronic use of opiate drugs for therapeutic purposes.    H/O meniscectomy of right knee -- medial meniscus  Chronic, stable, well-controlled.  Please see notes from same date of service 11/12/18 re: chronic use of opiate drugs for therapeutic purposes.    Lateral meniscus derangement, right  Chronic, uncontrolled, lateral knee joint pain of RLE, please see notes from same date of service 11/12/2015 regarding chronic use of opiate drugs for therapeutic purposes.      Patient Active Problem List    Diagnosis Date Noted   • Cellulitis of left lower extremity 11/12/2018   • Syncope and collapse 09/14/2018   • Chronic post-traumatic stress disorder (PTSD) 09/14/2018   • Lateral meniscus derangement, right 07/13/2018   • (HCC) Opioid type dependence, continuous use 05/14/2018   • Obesity (BMI 30-39.9) 01/25/2018   • Chronic left shoulder pain 01/25/2018   • H/O meniscectomy of right knee -- medial meniscus 01/25/2018   • Chronic use of opiate drugs therapeutic purposes 01/25/2018   • Chronic neck and back pain 03/07/2017       Additional History:   Allergies:    Patient has no known allergies.     Current Medications:     Current Outpatient Prescriptions   Medication Sig Dispense Refill   • [START ON 1/17/2019] oxyCODONE immediate release (ROXICODONE) 10 MG immediate release tablet Take 1 Tab by mouth every 6 hours as needed for Moderate Pain or Severe Pain for up to 30 days. 120 Tab 0   • doxycycline (VIBRAMYCIN) 100 MG Tab Take 1 Tab by mouth 2 times a day. 20 Tab 0   • pregabalin (LYRICA) 100 MG Cap Take  "100 mg by mouth 2 times a day.     • methocarbamol (ROBAXIN) 500 MG Tab Take 1,000 mg by mouth 4 times a day.     • acetaminophen (TYLENOL) 500 MG Tab Take 500-1,000 mg by mouth every 6 hours as needed.     • Albuterol Sulfate 108 (90 Base) MCG/ACT AEROSOL POWDER, BREATH ACTIVATED Inhale  by mouth.     • buPROPion (WELLBUTRIN) 100 MG Tab Take 100 mg by mouth 2 times a day.     • naproxen (NAPROSYN) 500 MG Tab Take 500 mg by mouth 2 times a day, with meals.     • divalproex (DEPAKOTE) 500 MG Tablet Delayed Response Take 1,000 mg by mouth every bedtime.     • lidocaine (LIDODERM) 5 % Patch Apply 1 Patch to skin as directed 1 time daily as needed.       No current facility-administered medications for this visit.         Social History:     Social History   Substance Use Topics   • Smoking status: Former Smoker     Packs/day: 1.00     Years: 13.00     Types: Cigarettes     Quit date: 1/1/2015   • Smokeless tobacco: Former User     Types: Chew   • Alcohol use Yes      Comment: Rarely drinks alcohol. Pt chewed tabacco years ago didn't chew tabacco every day.       ROS:     - NOTE: All other systems reviewed and are negative, except as in HPI.     Objective:   Physical Exam:    Vitals: Blood pressure 100/60, pulse 88, temperature 37.2 °C (98.9 °F), height 1.854 m (6' 0.99\"), weight 115.7 kg (255 lb), SpO2 100 %.   BMI: Body mass index is 33.65 kg/m².   General/Constitutional: Vitals as above, Well nourished, well developed male in no acute distress   Head/Eyes: Head is grossly normal & atraumatic, bilateral conjunctivae clear and not injected, bilateral EOMI, bilateral PERRL   ENT: Bilateral external ears grossly normal in appearance, Hearing grossly intact, External nares normal in appearance and without discharge/bleeding   Respiratory: No respiratory distress, bilateral lungs are clear to ausculation in all lung fields (anterior/lateral/posterior), no wheezing/rhonchi/rales   Cardiovascular: Regular rate and rhythm " without murmur/gallops/rubs, distal pulses are intact and equal bilaterally (radial, posterior tibial), no bilateral lower extremity edema   MSK: Bilateral trapezius tenderness to palpation with palpable spasms, left upper extremity with palpable crepitus on passive range of motion of left upper extremity, right lower extremity tenderness to palpation of lateral joint space, as well as pain on ambulation, gait mildly antalgic   Integumentary: Patient with skin findings as in HPI above no apparent rashes   Psych: Judgment grossly appropriate, no apparent depression/anxiety    Health Maintenance:     - Narxcheck appropriate    Imaging/Labs:     - UDS uptodate    Assessment and Plan:   1. Cellulitis of left lower extremity  New problem for diagnosis, uncontrolled, trial of antibiotics as below.   - doxycycline (VIBRAMYCIN) 100 MG Tab; Take 1 Tab by mouth 2 times a day.  Dispense: 20 Tab; Refill: 0    2. Chronic use of opiate drugs therapeutic purposes  3. Chronic neck and back pain  4. Chronic left shoulder pain  5. Opioid type dependence, continuous use (HCC)  6. Lateral meniscus derangement, right  7. H/O meniscectomy of right knee  Chronic, uncontrolled, patient advised to use NSAIDs (Narpoxen) in addition to opiates, as below   - oxyCODONE immediate release (ROXICODONE) 10 MG immediate release tablet; Take 1 Tab by mouth every 6 hours as needed for Moderate Pain or Severe Pain for up to 30 days.  Dispense: 120 Tab; Refill: 0    NOTE: Patient asking to have forms filled out once more for [employer] for FMLA.  These forms will be filled out and faxed this week.    RTC: in ~3months for pain management.    PLEASE NOTE: This dictation was created using voice recognition software. I have made every reasonable attempt to correct obvious errors, but I expect that there are errors of grammar and possibly content that I did not discover before finalizing the note.

## 2018-11-15 NOTE — ASSESSMENT & PLAN NOTE
Chronic, stable, well-controlled, taking medication as directed.      Patient states that pains are uncontrolled, states that he continues to hurt in neck, left shoulder, low back.  NO radicular pain at present time,      FMLA & Disability paperwork are somewhat related to pain, but more so related to the syncopal episodes he has been having, which are being evaluated by [facility], Dr[facility] Campa.    ROS is negative for bilateral upper extremity radicular pain/numbness/weakness.    ROS is NEGATIVE for respiratory depression, cognitive slowing, constipation, cyanotic skin changes.      ROS is NEGATIVE for confusion, altered mentation, word finding difficulty, memory loss, diplopia, visual scotomas, facial droop, dysarthria, dysphagia, hemiplegia, gait instability, new/abnormal paresthesias/numbness.

## 2018-12-05 ENCOUNTER — PATIENT MESSAGE (OUTPATIENT)
Dept: MEDICAL GROUP | Facility: MEDICAL CENTER | Age: 33
End: 2018-12-05

## 2018-12-05 ENCOUNTER — TELEPHONE (OUTPATIENT)
Dept: MEDICAL GROUP | Facility: MEDICAL CENTER | Age: 33
End: 2018-12-05

## 2018-12-05 NOTE — TELEPHONE ENCOUNTER
VOICEMAIL  1. Caller Name: Danie Pretty                        Call Back Number: [phone] (home)       2. Message: Pt is calling because he will be going out of town and will not return until after the new year. And will need a refill on his Oxycontin.     3. Patient approves office to leave a detailed voicemail/MyChart message: N\A

## 2018-12-05 NOTE — TELEPHONE ENCOUNTER
Per review of records, patient was given enough prescription refills to last him until February.  Did he not turn in these prescriptions?

## 2018-12-11 ENCOUNTER — TELEPHONE (OUTPATIENT)
Dept: MEDICAL GROUP | Facility: MEDICAL CENTER | Age: 33
End: 2018-12-11

## 2018-12-11 NOTE — TELEPHONE ENCOUNTER
VOICEMAIL  1. Caller Name: Jacqueline ta RN case manager with [employer]                      Call Back Number: [phone] [phone]    2. Message: Jacqueline is calling because she would like to talk to us. Because the pt is stating that for the last couple of months he has been randomly passing out. And he has had all kinds of test with nothing showing, and needs to know is she is missing anything.      3. Patient approves office to leave a detailed voicemail/MyChart message: N\A

## 2018-12-12 NOTE — TELEPHONE ENCOUNTER
Please call her back  1) The patient hasn't mentioned this to me.  2) The patient hasn't signed paperwork allowing us to disclose his protected health information.

## 2019-01-14 ENCOUNTER — OFFICE VISIT (OUTPATIENT)
Dept: MEDICAL GROUP | Facility: MEDICAL CENTER | Age: 34
End: 2019-01-14
Payer: COMMERCIAL

## 2019-01-14 VITALS
HEIGHT: 73 IN | HEART RATE: 101 BPM | SYSTOLIC BLOOD PRESSURE: 112 MMHG | TEMPERATURE: 98.7 F | OXYGEN SATURATION: 95 % | DIASTOLIC BLOOD PRESSURE: 64 MMHG | BODY MASS INDEX: 31.25 KG/M2 | WEIGHT: 235.8 LBS

## 2019-01-14 DIAGNOSIS — M54.9 CHRONIC NECK AND BACK PAIN: ICD-10-CM

## 2019-01-14 DIAGNOSIS — Z79.891 CHRONIC USE OF OPIATE DRUGS THERAPEUTIC PURPOSES: ICD-10-CM

## 2019-01-14 DIAGNOSIS — F11.20 OPIOID TYPE DEPENDENCE, CONTINUOUS USE (HCC): ICD-10-CM

## 2019-01-14 DIAGNOSIS — G89.29 CHRONIC NECK AND BACK PAIN: ICD-10-CM

## 2019-01-14 DIAGNOSIS — M23.300 LATERAL MENISCUS DERANGEMENT, RIGHT: ICD-10-CM

## 2019-01-14 DIAGNOSIS — M25.512 CHRONIC LEFT SHOULDER PAIN: ICD-10-CM

## 2019-01-14 DIAGNOSIS — Z98.890 H/O MENISCECTOMY OF RIGHT KNEE: ICD-10-CM

## 2019-01-14 DIAGNOSIS — G89.29 CHRONIC LEFT SHOULDER PAIN: ICD-10-CM

## 2019-01-14 DIAGNOSIS — M54.2 CHRONIC NECK AND BACK PAIN: ICD-10-CM

## 2019-01-14 PROBLEM — L03.116 CELLULITIS OF LEFT LOWER EXTREMITY: Status: RESOLVED | Noted: 2018-11-12 | Resolved: 2019-01-14

## 2019-01-14 PROCEDURE — 99214 OFFICE O/P EST MOD 30 MIN: CPT | Performed by: FAMILY MEDICINE

## 2019-01-14 RX ORDER — OXYCODONE HYDROCHLORIDE 10 MG/1
10 TABLET ORAL EVERY 6 HOURS PRN
Qty: 120 TAB | Refills: 0 | Status: SHIPPED | OUTPATIENT
Start: 2019-03-19 | End: 2019-01-14 | Stop reason: SDUPTHER

## 2019-01-14 RX ORDER — NAPROXEN 500 MG/1
500 TABLET ORAL 2 TIMES DAILY WITH MEALS
Qty: 180 TAB | Refills: 0 | Status: SHIPPED | OUTPATIENT
Start: 2019-01-14

## 2019-01-14 RX ORDER — OXYCODONE HYDROCHLORIDE 10 MG/1
10 TABLET ORAL EVERY 6 HOURS PRN
Qty: 120 TAB | Refills: 0 | Status: SHIPPED | OUTPATIENT
Start: 2019-02-16 | End: 2019-01-14 | Stop reason: SDUPTHER

## 2019-01-14 RX ORDER — OXYCODONE HYDROCHLORIDE 10 MG/1
10 TABLET ORAL EVERY 6 HOURS PRN
Qty: 120 TAB | Refills: 0 | Status: SHIPPED | OUTPATIENT
Start: 2019-03-18 | End: 2019-01-14 | Stop reason: SDUPTHER

## 2019-01-14 RX ORDER — OXYCODONE HYDROCHLORIDE 10 MG/1
10 TABLET ORAL EVERY 6 HOURS PRN
Qty: 120 TAB | Refills: 0 | Status: SHIPPED | OUTPATIENT
Start: 2019-01-17 | End: 2019-01-14 | Stop reason: SDUPTHER

## 2019-01-14 RX ORDER — METHOCARBAMOL 500 MG/1
1000 TABLET, FILM COATED ORAL 4 TIMES DAILY
Qty: 720 TAB | Refills: 0 | Status: SHIPPED | OUTPATIENT
Start: 2019-01-14

## 2019-01-14 RX ORDER — OXYCODONE HYDROCHLORIDE 10 MG/1
10 TABLET ORAL EVERY 6 HOURS PRN
Qty: 120 TAB | Refills: 0 | Status: SHIPPED | OUTPATIENT
Start: 2019-04-18 | End: 2019-04-17 | Stop reason: SDUPTHER

## 2019-01-14 RX ORDER — OXYCODONE HYDROCHLORIDE 10 MG/1
10 TABLET ORAL EVERY 6 HOURS PRN
Qty: 120 TAB | Refills: 0 | Status: SHIPPED | OUTPATIENT
Start: 2019-02-17 | End: 2019-01-14 | Stop reason: SDUPTHER

## 2019-01-14 ASSESSMENT — PATIENT HEALTH QUESTIONNAIRE - PHQ9: CLINICAL INTERPRETATION OF PHQ2 SCORE: 0

## 2019-01-18 NOTE — PROGRESS NOTES
Subjective:   Chief Complaint/History of Present Illness:  Danie Pretty is a 34 y.o. male established patient who presents today to discuss medical problems as listed below=    Diagnoses of Opioid type dependence, continuous use (HCC), Chronic use of opiate drugs therapeutic purposes, Chronic left shoulder pain, Chronic neck and back pain, Lateral meniscus derangement, right, and H/O meniscectomy of right knee were pertinent to this visit.    (HCC) Opioid type dependence, continuous use  Chronic, stable, but pains continue to be uncontrolled, continue to be reported as chronic dull aches, worsened with movement, improved with rest.  Patient localizes pain to neck, left shoulder, low back.        ROS is negative for radicular pain at present time,      FMLA & Disability paperwork are somewhat related to pain, but more so related to the syncopal episodes he has been having, which are being evaluated by [facility], [facility] Campa.     ROS is negative for bilateral upper extremity radicular pain/numbness/weakness.     ROS is NEGATIVE for respiratory depression, cognitive slowing, constipation, cyanotic skin changes.      ROS is NEGATIVE for confusion, altered mentation, word finding difficulty, memory loss, diplopia, visual scotomas, facial droop, dysarthria, dysphagia, hemiplegia, gait instability, new/abnormal paresthesias/numbness.    Chronic left shoulder pain  Chronic, uncontrolled, please see notes from same date of service 1/14/2019 regarding opioid type dependence, continuous use.    Chronic neck and back pain  Chronic, uncontrolled, please see notes from same date of service 1/14/2019 regarding opioid type dependence, continuous use.    Chronic use of opiate drugs therapeutic purposes  Chronic, uncontrolled, please see notes from same date of service 1/14/2019 regarding opioid type dependence, continuous use.    Lateral meniscus derangement, right  Chronic, uncontrolled, please see notes from  same date of service 1/14/2019 regarding opioid type dependence, continuous use.    H/O meniscectomy of right knee -- medial meniscus  Chronic, uncontrolled, please see notes from same date of service 1/14/2019 regarding opioid type dependence, continuous use.      Patient Active Problem List    Diagnosis Date Noted   • Syncope and collapse 09/14/2018   • Chronic post-traumatic stress disorder (PTSD) 09/14/2018   • Lateral meniscus derangement, right 07/13/2018   • (HCC) Opioid type dependence, continuous use 05/14/2018   • Obesity (BMI 30-39.9) 01/25/2018   • Chronic left shoulder pain 01/25/2018   • H/O meniscectomy of right knee -- medial meniscus 01/25/2018   • Chronic use of opiate drugs therapeutic purposes 01/25/2018   • Chronic neck and back pain 03/07/2017       Additional History:   Allergies:    Patient has no known allergies.     Current Medications:     Current Outpatient Prescriptions   Medication Sig Dispense Refill   • naproxen (NAPROSYN) 500 MG Tab Take 1 Tab by mouth 2 times a day, with meals. 180 Tab 0   • methocarbamol (ROBAXIN) 500 MG Tab Take 2 Tabs by mouth 4 times a day. 720 Tab 0   • [START ON 4/18/2019] oxyCODONE immediate release (ROXICODONE) 10 MG immediate release tablet Take 1 Tab by mouth every 6 hours as needed for Moderate Pain or Severe Pain for up to 30 days. 120 Tab 0   • pregabalin (LYRICA) 100 MG Cap Take 100 mg by mouth 2 times a day.     • acetaminophen (TYLENOL) 500 MG Tab Take 500-1,000 mg by mouth every 6 hours as needed.     • Albuterol Sulfate 108 (90 Base) MCG/ACT AEROSOL POWDER, BREATH ACTIVATED Inhale  by mouth.     • buPROPion (WELLBUTRIN) 100 MG Tab Take 100 mg by mouth 2 times a day.     • divalproex (DEPAKOTE) 500 MG Tablet Delayed Response Take 1,000 mg by mouth every bedtime.     • lidocaine (LIDODERM) 5 % Patch Apply 1 Patch to skin as directed 1 time daily as needed.       No current facility-administered medications for this visit.         Social  "History:     Social History   Substance Use Topics   • Smoking status: Former Smoker     Packs/day: 1.00     Years: 13.00     Types: Cigarettes     Quit date: 1/1/2015   • Smokeless tobacco: Former User     Types: Chew   • Alcohol use Yes      Comment: Rarely drinks alcohol. Pt chewed tabacco years ago didn't chew tabacco every day.       ROS:     - NOTE: All other systems reviewed and are negative, except as in HPI.     Objective:   Physical Exam:    Vitals: Blood pressure 112/64, pulse (!) 101, temperature 37.1 °C (98.7 °F), height 1.854 m (6' 0.99\"), weight 107 kg (235 lb 12.8 oz), SpO2 95 %.   BMI: Body mass index is 31.12 kg/m².   General/Constitutional: Vitals as above, Well nourished, well developed male in no acute distress   Head/Eyes: Head is grossly normal & atraumatic, bilateral conjunctivae clear and not injected, bilateral EOMI, bilateral PERRL   ENT: Bilateral external ears grossly normal in appearance, Hearing grossly intact, External nares normal in appearance and without discharge/bleeding   Respiratory: No respiratory distress, bilateral lungs are clear to ausculation in all lung fields (anterior/lateral/posterior), no wheezing/rhonchi/rales   Cardiovascular: Regular rate and rhythm without murmur/gallops/rubs, distal pulses are intact and equal bilaterally (radial, posterior tibial), no bilateral lower extremity edema   MSK: Gait grossly normal & not antalgic   Integumentary: No apparent rashes   Psych: Judgment grossly appropriate, no apparent depression/anxiety    Health Maintenance:     - Narxcheck is up-to-date    Imaging/Labs:     - Urine drug screen is up-to-date    Assessment and Plan:   1. Opioid type dependence, continuous use (HCC)  2. Chronic use of opiate drugs therapeutic purposes  Chronic, uncontrolled, patient to continue using opiate narcotics for pain control, however to start using NSAIDs more regularly as baseline pain control and anti-inflammatory effects.  Patient verbalized " understanding.   - naproxen (NAPROSYN) 500 MG Tab; Take 1 Tab by mouth 2 times a  day, with meals.  Dispense: 180 Tab; Refill: 0   - Consent for Opiate Prescription   - oxyCODONE immediate release (ROXICODONE) 10 MG immediate release tablet; Take 1 Tab by mouth every 6 hours as needed for Moderate Pain or Severe Pain for up to 30 days.  Dispense: 120 Tab; Refill: 0    3. Chronic left shoulder pain  Chronic, uncontrolled, please see assessment and plan as in #1 above   - naproxen (NAPROSYN) 500 MG Tab; Take 1 Tab by mouth 2 times a day, with meals.  Dispense: 180 Tab; Refill: 0   - methocarbamol (ROBAXIN) 500 MG Tab; Take 2 Tabs by mouth 4 times a day.  Dispense: 720 Tab; Refill: 0   - Consent for Opiate Prescription   - oxyCODONE immediate release (ROXICODONE) 10 MG immediate release tablet; Take 1 Tab by mouth every 6 hours as needed for Moderate Pain or Severe Pain for up to 30 days.  Dispense: 120 Tab; Refill: 0    4. Chronic neck and back pain  Chronic, uncontrolled, please see assessment and plan as in #1 above   - naproxen (NAPROSYN) 500 MG Tab; Take 1 Tab by mouth 2 times a day, with meals.  Dispense: 180 Tab; Refill: 0   - methocarbamol (ROBAXIN) 500 MG Tab; Take 2 Tabs by mouth 4 times a day.  Dispense: 720 Tab; Refill: 0   - Consent for Opiate Prescription   - oxyCODONE immediate release (ROXICODONE) 10 MG immediate release tablet; Take 1 Tab by mouth every 6 hours as needed for Moderate Pain or Severe Pain for up to 30 days.  Dispense: 120 Tab; Refill: 0    5. Lateral meniscus derangement, right  6. H/O meniscectomy of right knee  Chronic, uncontrolled, please see assessment and plan as in #1 above   - naproxen (NAPROSYN) 500 MG Tab; Take 1 Tab by mouth 2 times a day, with meals.  Dispense: 180 Tab; Refill: 0   - methocarbamol (ROBAXIN) 500 MG Tab; Take 2 Tabs by mouth 4 times a day.  Dispense: 720 Tab; Refill: 0   - Consent for Opiate Prescription   - oxyCODONE immediate release (ROXICODONE) 10 MG  immediate release tablet; Take 1 Tab by mouth every 6 hours as needed for Moderate Pain or Severe Pain for up to 30 days.  Dispense: 120 Tab; Refill: 0          RTC: in 3months for Pain management.    PLEASE NOTE: This dictation was created using voice recognition software. I have made every reasonable attempt to correct obvious errors, but I expect that there are errors of grammar and possibly content that I did not discover before finalizing the note.

## 2019-01-18 NOTE — ASSESSMENT & PLAN NOTE
Chronic, stable, but pains continue to be uncontrolled, continue to be reported as chronic dull aches, worsened with movement, improved with rest.  Patient localizes pain to neck, left shoulder, low back.        ROS is negative for radicular pain at present time,      FMLA & Disability paperwork are somewhat related to pain, but more so related to the syncopal episodes he has been having, which are being evaluated [facility], [facility] Campa.     ROS is negative for bilateral upper extremity radicular pain/numbness/weakness.     ROS is NEGATIVE for respiratory depression, cognitive slowing, constipation, cyanotic skin changes.      ROS is NEGATIVE for confusion, altered mentation, word finding difficulty, memory loss, diplopia, visual scotomas, facial droop, dysarthria, dysphagia, hemiplegia, gait instability, new/abnormal paresthesias/numbness.

## 2019-01-18 NOTE — ASSESSMENT & PLAN NOTE
Chronic, uncontrolled, please see notes from same date of service 1/14/2019 regarding opioid type dependence, continuous use.

## 2019-04-17 ENCOUNTER — HOSPITAL ENCOUNTER (OUTPATIENT)
Facility: MEDICAL CENTER | Age: 34
End: 2019-04-17
Attending: FAMILY MEDICINE

## 2019-04-17 ENCOUNTER — OFFICE VISIT (OUTPATIENT)
Dept: MEDICAL GROUP | Facility: MEDICAL CENTER | Age: 34
End: 2019-04-17

## 2019-04-17 VITALS
WEIGHT: 232.2 LBS | HEART RATE: 94 BPM | OXYGEN SATURATION: 94 % | DIASTOLIC BLOOD PRESSURE: 60 MMHG | HEIGHT: 73 IN | BODY MASS INDEX: 30.77 KG/M2 | SYSTOLIC BLOOD PRESSURE: 100 MMHG | TEMPERATURE: 98.2 F

## 2019-04-17 DIAGNOSIS — Z79.891 CHRONIC USE OF OPIATE DRUGS THERAPEUTIC PURPOSES: ICD-10-CM

## 2019-04-17 DIAGNOSIS — G89.29 CHRONIC NECK AND BACK PAIN: ICD-10-CM

## 2019-04-17 DIAGNOSIS — M54.2 CHRONIC NECK AND BACK PAIN: ICD-10-CM

## 2019-04-17 DIAGNOSIS — M25.512 CHRONIC LEFT SHOULDER PAIN: ICD-10-CM

## 2019-04-17 DIAGNOSIS — M54.9 CHRONIC NECK AND BACK PAIN: ICD-10-CM

## 2019-04-17 DIAGNOSIS — Z98.890 H/O MENISCECTOMY OF RIGHT KNEE: ICD-10-CM

## 2019-04-17 DIAGNOSIS — M17.2 BILATERAL POST-TRAUMATIC OSTEOARTHRITIS OF KNEE: ICD-10-CM

## 2019-04-17 DIAGNOSIS — E66.9 OBESITY (BMI 30-39.9): ICD-10-CM

## 2019-04-17 DIAGNOSIS — F11.20 OPIOID TYPE DEPENDENCE, CONTINUOUS USE (HCC): ICD-10-CM

## 2019-04-17 DIAGNOSIS — G89.29 CHRONIC LEFT SHOULDER PAIN: ICD-10-CM

## 2019-04-17 DIAGNOSIS — M23.300 LATERAL MENISCUS DERANGEMENT, RIGHT: ICD-10-CM

## 2019-04-17 PROBLEM — M25.562 CHRONIC PAIN OF LEFT KNEE: Status: ACTIVE | Noted: 2019-04-17

## 2019-04-17 PROCEDURE — 80307 DRUG TEST PRSMV CHEM ANLYZR: CPT

## 2019-04-17 PROCEDURE — 99214 OFFICE O/P EST MOD 30 MIN: CPT | Performed by: FAMILY MEDICINE

## 2019-04-17 RX ORDER — OXYCODONE HYDROCHLORIDE 10 MG/1
10 TABLET ORAL EVERY 6 HOURS PRN
Qty: 105 TAB | Refills: 0 | Status: SHIPPED | OUTPATIENT
Start: 2019-04-20 | End: 2019-04-17 | Stop reason: SDUPTHER

## 2019-04-17 RX ORDER — OXYCODONE HYDROCHLORIDE 10 MG/1
10 TABLET ORAL EVERY 6 HOURS PRN
Qty: 105 TAB | Refills: 0 | Status: SHIPPED | OUTPATIENT
Start: 2019-06-19 | End: 2019-07-11 | Stop reason: SDUPTHER

## 2019-04-17 RX ORDER — OXYCODONE HYDROCHLORIDE 10 MG/1
10 TABLET ORAL EVERY 6 HOURS PRN
Qty: 105 TAB | Refills: 0 | Status: SHIPPED | OUTPATIENT
Start: 2019-05-20 | End: 2019-04-17 | Stop reason: SDUPTHER

## 2019-04-17 NOTE — ASSESSMENT & PLAN NOTE
Chronic, uncontrolled, patient states that he has recently started working out, paid a  to have intense physical training, and has lost a significant amount of weight the last few months as well as body fat.    While patient's pain are still present in his left shoulder, bilateral knees, neck as well as low back, overall he states that his pain is better managed by the Roxicodone as well as naproxen than previously.  Patient states that he would like to start decreasing his dosage of oxycodone over time, therefore we will trial him on 105 tablets for the next 3 months, with plans to decrease by 15 tablets every 3 months until we get him to a study amount of medications.    We discussed how his physical exercises are allowing him to optimize his musculoskeletal function despite having underlying skeletal abnormalities such as suspected lateral meniscus derangement as well as likely bilateral knee osteoarthritis.    Patient's urine drug screen is due today, and Narxcheck is appropriate.    ROS is NEGATIVE for respiratory depression, cognitive slowing, constipation, cyanotic skin changes.     ROS is NEGATIVE for BLE radicular pain, saddle paresthesias, bowel or bladder incontinence, gait instability

## 2019-04-17 NOTE — ASSESSMENT & PLAN NOTE
Chronic, uncontrolled, improving.  See notes from same date of service 04/17/19 re: chronic use of opiate drugs for therapeutic purposes

## 2019-04-17 NOTE — PROGRESS NOTES
Subjective:   Chief Complaint/History of Present Illness:  Danie Pretty is a 34 y.o. male established patient who presents today to discuss medical problems as listed below    Diagnoses of Chronic use of opiate drugs therapeutic purposes, (HCC) Opioid type dependence, continuous use, Chronic left shoulder pain, Chronic neck and back pain, Bilateral post-traumatic osteoarthritis of knee, Lateral meniscus derangement, right, H/O meniscectomy of right knee -- medial meniscus, and Obesity (BMI 30-39.9) were pertinent to this visit.    Chronic use of opiate drugs therapeutic purposes  Chronic, uncontrolled, patient states that he has recently started working out, paid a  to have intense physical training, and has lost a significant amount of weight the last few months as well as body fat.    While patient's pain are still present in his left shoulder, bilateral knees, neck as well as low back, overall he states that his pain is better managed by the Roxicodone as well as naproxen than previously.  Patient states that he would like to start decreasing his dosage of oxycodone over time, therefore we will trial him on 105 tablets for the next 3 months, with plans to decrease by 15 tablets every 3 months until we get him to a study amount of medications.    We discussed how his physical exercises are allowing him to optimize his musculoskeletal function despite having underlying skeletal abnormalities such as suspected lateral meniscus derangement as well as likely bilateral knee osteoarthritis.    Patient's urine drug screen is due today, and Narxcheck is appropriate.    ROS is NEGATIVE for respiratory depression, cognitive slowing, constipation, cyanotic skin changes.     ROS is NEGATIVE for BLE radicular pain, saddle paresthesias, bowel or bladder incontinence, gait instability    (HCC) Opioid type dependence, continuous use  Chronic, uncontrolled, improving.  See notes from same date of service  04/17/19 re: chronic use of opiate drugs for therapeutic purposes    Chronic left shoulder pain  Chronic, uncontrolled, improving.  See notes from same date of service 04/17/19 re: chronic use of opiate drugs for therapeutic purposes    Chronic neck and back pain  Chronic, uncontrolled, improving.  See notes from same date of service 04/17/19 re: chronic use of opiate drugs for therapeutic purposes    Bilateral post-traumatic osteoarthritis of knee  Chronic, uncontrolled, improving.  See notes from same date of service 04/17/19 re: chronic use of opiate drugs for therapeutic purposes    Lateral meniscus derangement, right  Chronic, uncontrolled, improving.  See notes from same date of service 04/17/19 re: chronic use of opiate drugs for therapeutic purposes    H/O meniscectomy of right knee -- medial meniscus  Chronic, uncontrolled, improving.  See notes from same date of service 04/17/19 re: chronic use of opiate drugs for therapeutic purposes    Obesity (BMI 30-39.9)  Chronic, uncontrolled, patient advised to pursue lifestyle changes, particularly cardiovascular exercise and increasing proportion of plant-based nutrition.  Discussed that he can continue with exercise plan/ as he is currently doing, but that he may need to consider beans more for his protein source to also have high complex carb intake.      Patient Active Problem List    Diagnosis Date Noted   • Bilateral post-traumatic osteoarthritis of knee 04/17/2019   • Syncope and collapse 09/14/2018   • Chronic post-traumatic stress disorder (PTSD) 09/14/2018   • Lateral meniscus derangement, right 07/13/2018   • (HCC) Opioid type dependence, continuous use 05/14/2018   • Obesity (BMI 30-39.9) 01/25/2018   • Chronic left shoulder pain 01/25/2018   • H/O meniscectomy of right knee -- medial meniscus 01/25/2018   • Chronic use of opiate drugs therapeutic purposes 01/25/2018   • Chronic neck and back pain 03/07/2017       Additional  "History:   Allergies:    Patient has no known allergies.     Current Medications:     Current Outpatient Prescriptions   Medication Sig Dispense Refill   • [START ON 6/19/2019] oxyCODONE immediate release (ROXICODONE) 10 MG immediate release tablet Take 1 Tab by mouth every 6 hours as needed for Moderate Pain or Severe Pain for up to 30 days. 105 Tab 0   • naproxen (NAPROSYN) 500 MG Tab Take 1 Tab by mouth 2 times a day, with meals. 180 Tab 0   • methocarbamol (ROBAXIN) 500 MG Tab Take 2 Tabs by mouth 4 times a day. 720 Tab 0   • pregabalin (LYRICA) 100 MG Cap Take 100 mg by mouth 2 times a day.     • acetaminophen (TYLENOL) 500 MG Tab Take 500-1,000 mg by mouth every 6 hours as needed.     • Albuterol Sulfate 108 (90 Base) MCG/ACT AEROSOL POWDER, BREATH ACTIVATED Inhale  by mouth.     • buPROPion (WELLBUTRIN) 100 MG Tab Take 100 mg by mouth 2 times a day.     • divalproex (DEPAKOTE) 500 MG Tablet Delayed Response Take 1,000 mg by mouth every bedtime.     • lidocaine (LIDODERM) 5 % Patch Apply 1 Patch to skin as directed 1 time daily as needed.       No current facility-administered medications for this visit.         Social History:     Social History   Substance Use Topics   • Smoking status: Former Smoker     Packs/day: 1.00     Years: 13.00     Types: Cigarettes     Quit date: 1/1/2015   • Smokeless tobacco: Former User     Types: Chew   • Alcohol use Yes      Comment: Rarely drinks alcohol. Pt chewed tabacco years ago didn't chew tabacco every day.       ROS:     - NOTE: All other systems reviewed and are negative, except as in HPI.     Objective:   Physical Exam:    Vitals: /60   Pulse 94   Temp 36.8 °C (98.2 °F)   Ht 1.854 m (6' 0.99\")   Wt 105.3 kg (232 lb 3.2 oz)   SpO2 94%    BMI: Body mass index is 30.64 kg/m².   General/Constitutional: Vitals as above, Well nourished, well developed male in no acute distress   Head/Eyes: Head is grossly normal & atraumatic, bilateral conjunctivae clear and not " injected, bilateral EOMI, bilateral PERRL   ENT: Bilateral external ears grossly normal in appearance, Hearing grossly intact, External nares normal in appearance and without discharge/bleeding   Respiratory: No respiratory distress, bilateral lungs are clear to ausculation in all lung fields (anterior/lateral/posterior), no wheezing/rhonchi/rales   Cardiovascular: Regular rate and rhythm without murmur/gallops/rubs, distal pulses are intact and equal bilaterally (radial, posterior tibial), no bilateral lower extremity edema   MSK: Bilateral knees with moderate crepitus on passive range of motion (R>L) without any gross erythemea/edema at present, L shoulder with mild pain on passive ROM but without palpable crepitus, paraspinal muscular tenderness to palpation in cervical and lumbar back, no paresthesias nor numbness on gentle percussion of vertebral processes of entire back, Gait mildly antalgic   Integumentary: No apparent rashes   Psych: Judgment grossly appropriate, no apparent depression/anxiety    Health Maintenance:     - NarxCheck is appopriate    Imaging/Labs:     - UDS is due today    Assessment and Plan:   1. Chronic use of opiate drugs therapeutic purposes  2. (HCC) Opioid type dependence, continuous use  3. Chronic left shoulder pain  4. Chronic neck and back pain  5. Bilateral post-traumatic osteoarthritis of knee  6. Lateral meniscus derangement, right  7. H/O meniscectomy of right knee -- medial meniscus  Chronic, Uncontrolled, improving.  Patient advised to continue with exercise plan as he is currently doing, to be more mindful of his intake of NSAIDs versus opiate narcotics, to patient also needs to have UDS today, will collect urine.   - PAIN MANAGEMENT SCRN, W/ RFLX TO QNT; Future   - oxyCODONE immediate release (ROXICODONE) 10 MG immediate release tablet; Take 1 Tab by mouth every 6 hours as needed for Moderate Pain or Severe Pain for up to 30 days.  Dispense: 105 Tab; Refill: 0      8.  Obesity (BMI 30-39.9)  Chronic, uncontrolled, patient advised to pursue lifestyle changes, particularly cardiovascular exercise and increasing proportion of plant-based nutrition.    NOTE: Patient mentions that he may want to switch primary medical care completely to me, and also states that he may not be having as much neurological evaluation for syncope & collapse as he'd like.  Of note, the syncope & collapse is no longer occurring.        RTC: in 3months for Pain management.    PLEASE NOTE: This dictation was created using voice recognition software. I have made every reasonable attempt to correct obvious errors, but I expect that there are errors of grammar and possibly content that I did not discover before finalizing the note.

## 2019-04-17 NOTE — ASSESSMENT & PLAN NOTE
Chronic, uncontrolled, patient advised to pursue lifestyle changes, particularly cardiovascular exercise and increasing proportion of plant-based nutrition.  Discussed that he can continue with exercise plan/ as he is currently doing, but that he may need to consider beans more for his protein source to also have high complex carb intake.

## 2019-04-18 DIAGNOSIS — M54.2 CHRONIC NECK AND BACK PAIN: ICD-10-CM

## 2019-04-18 DIAGNOSIS — F11.20 OPIOID TYPE DEPENDENCE, CONTINUOUS USE (HCC): ICD-10-CM

## 2019-04-18 DIAGNOSIS — M54.9 CHRONIC NECK AND BACK PAIN: ICD-10-CM

## 2019-04-18 DIAGNOSIS — G89.29 CHRONIC LEFT SHOULDER PAIN: ICD-10-CM

## 2019-04-18 DIAGNOSIS — G89.29 CHRONIC NECK AND BACK PAIN: ICD-10-CM

## 2019-04-18 DIAGNOSIS — Z79.891 CHRONIC USE OF OPIATE DRUGS THERAPEUTIC PURPOSES: ICD-10-CM

## 2019-04-18 DIAGNOSIS — M25.512 CHRONIC LEFT SHOULDER PAIN: ICD-10-CM

## 2019-04-21 LAB

## 2019-07-11 ENCOUNTER — OFFICE VISIT (OUTPATIENT)
Dept: MEDICAL GROUP | Facility: MEDICAL CENTER | Age: 34
End: 2019-07-11

## 2019-07-11 VITALS
DIASTOLIC BLOOD PRESSURE: 80 MMHG | TEMPERATURE: 97.8 F | WEIGHT: 248.24 LBS | HEIGHT: 73 IN | OXYGEN SATURATION: 94 % | SYSTOLIC BLOOD PRESSURE: 122 MMHG | BODY MASS INDEX: 32.9 KG/M2 | HEART RATE: 80 BPM

## 2019-07-11 DIAGNOSIS — M23.300 LATERAL MENISCUS DERANGEMENT, RIGHT: ICD-10-CM

## 2019-07-11 DIAGNOSIS — M17.2 BILATERAL POST-TRAUMATIC OSTEOARTHRITIS OF KNEE: ICD-10-CM

## 2019-07-11 DIAGNOSIS — F11.20 OPIOID TYPE DEPENDENCE, CONTINUOUS USE (HCC): ICD-10-CM

## 2019-07-11 DIAGNOSIS — M25.512 CHRONIC LEFT SHOULDER PAIN: ICD-10-CM

## 2019-07-11 DIAGNOSIS — E66.9 OBESITY (BMI 30-39.9): ICD-10-CM

## 2019-07-11 DIAGNOSIS — M54.2 CHRONIC NECK AND BACK PAIN: ICD-10-CM

## 2019-07-11 DIAGNOSIS — M54.9 CHRONIC NECK AND BACK PAIN: ICD-10-CM

## 2019-07-11 DIAGNOSIS — Z98.890 H/O MENISCECTOMY OF RIGHT KNEE: ICD-10-CM

## 2019-07-11 DIAGNOSIS — Z79.891 CHRONIC USE OF OPIATE DRUG FOR THERAPEUTIC PURPOSE: ICD-10-CM

## 2019-07-11 DIAGNOSIS — G89.29 CHRONIC LEFT SHOULDER PAIN: ICD-10-CM

## 2019-07-11 DIAGNOSIS — G89.29 CHRONIC NECK AND BACK PAIN: ICD-10-CM

## 2019-07-11 PROCEDURE — 99214 OFFICE O/P EST MOD 30 MIN: CPT | Performed by: FAMILY MEDICINE

## 2019-07-11 RX ORDER — OXYCODONE HYDROCHLORIDE 10 MG/1
10 TABLET ORAL EVERY 6 HOURS PRN
Qty: 105 TAB | Refills: 0 | Status: SHIPPED | OUTPATIENT
Start: 2019-07-18 | End: 2019-07-11 | Stop reason: SDUPTHER

## 2019-07-11 RX ORDER — OXYCODONE HYDROCHLORIDE 10 MG/1
10 TABLET ORAL EVERY 6 HOURS PRN
Qty: 105 TAB | Refills: 0 | Status: SHIPPED | OUTPATIENT
Start: 2019-09-16 | End: 2019-10-16

## 2019-07-11 RX ORDER — OXYCODONE HYDROCHLORIDE 10 MG/1
10 TABLET ORAL EVERY 6 HOURS PRN
Qty: 105 TAB | Refills: 0 | Status: SHIPPED | OUTPATIENT
Start: 2019-08-17 | End: 2019-07-11 | Stop reason: SDUPTHER

## 2019-07-11 NOTE — LETTER
July 11, 2019         Patient: Danie Pretty   YOB: 1985   Date of Visit: 7/11/2019           To Whom it May Concern:     Danie Pretty was seen in my clinic on 07/11/2019.  Please excuse him from any absences or tardiness due to this medical appointment.     If you have any questions or concerns, please don't hesitate to call.        Sincerely,           Conrad Huston M.D.  Electronically Signed

## 2019-07-25 NOTE — PROGRESS NOTES
Subjective:   Chief Complaint/History of Present Illness:  Danie Pretty is a 34 y.o. male established patient who presents today to discuss medical problems as listed below    Diagnoses of Chronic use of opiate drug for therapeutic purpose, (HCC) Opioid type dependence, continuous use, Chronic left shoulder pain, Chronic neck and back pain, Lateral meniscus derangement, right, H/O meniscectomy of right knee -- medial meniscus, Bilateral post-traumatic osteoarthritis of knee, and Obesity (BMI 30-39.9) were pertinent to this visit.    Chronic use of opiate drug for therapeutic purpose  Chronic, stable, well-controlled, taking medication as directed.      ROS is NEGATIVE for respiratory depression, cognitive slowing, constipation, cyanotic skin changes.    ROS is NEGATIVE for BLE radicular pain, saddle paresthesias, bowel or bladder incontinence, gait instability    (HCC) Opioid type dependence, continuous use  Chronic, stable, well-controlled.  Please see notes from same day service 7/11/2019 regarding chronic use of opiate drugs for therapeutic purposes.    Chronic left shoulder pain  Chronic, stable, well-controlled.  Please see notes from same day service 7/11/2019 regarding chronic use of opiate drugs for therapeutic purposes.    Chronic neck and back pain  Chronic, stable, well-controlled.  Please see notes from same day service 7/11/2019 regarding chronic use of opiate drugs for therapeutic purposes.    Lateral meniscus derangement, right  Chronic, stable, well-controlled.  Please see notes from same day service 7/11/2019 regarding chronic use of opiate drugs for therapeutic purposes.    H/O meniscectomy of right knee -- medial meniscus  Chronic, stable, well-controlled.  Please see notes from same day service 7/11/2019 regarding chronic use of opiate drugs for therapeutic purposes.    Bilateral post-traumatic osteoarthritis of knee  Chronic, stable, well-controlled.  Please see notes from same day service  7/11/2019 regarding chronic use of opiate drugs for therapeutic purposes.    Obesity (BMI 30-39.9)  Chronic, uncontrolled, patient advised to pursue lifestyle changes, particularly cardiovascular exercise and increasing proportion of plant-based nutrition.      Patient Active Problem List    Diagnosis Date Noted   • Bilateral post-traumatic osteoarthritis of knee 04/17/2019   • Syncope and collapse 09/14/2018   • Chronic post-traumatic stress disorder (PTSD) 09/14/2018   • Lateral meniscus derangement, right 07/13/2018   • (HCC) Opioid type dependence, continuous use 05/14/2018   • Obesity (BMI 30-39.9) 01/25/2018   • Chronic left shoulder pain 01/25/2018   • H/O meniscectomy of right knee -- medial meniscus 01/25/2018   • Chronic use of opiate drug for therapeutic purpose 01/25/2018   • Chronic neck and back pain 03/07/2017       Additional History:   Allergies:    Patient has no known allergies.     Current Medications:     Current Outpatient Prescriptions   Medication Sig Dispense Refill   • [START ON 9/16/2019] oxyCODONE immediate release (ROXICODONE) 10 MG immediate release tablet Take 1 Tab by mouth every 6 hours as needed for Moderate Pain or Severe Pain for up to 30 days. 105 Tab 0   • naproxen (NAPROSYN) 500 MG Tab Take 1 Tab by mouth 2 times a day, with meals. 180 Tab 0   • methocarbamol (ROBAXIN) 500 MG Tab Take 2 Tabs by mouth 4 times a day. 720 Tab 0   • pregabalin (LYRICA) 100 MG Cap Take 100 mg by mouth 2 times a day.     • acetaminophen (TYLENOL) 500 MG Tab Take 500-1,000 mg by mouth every 6 hours as needed.     • Albuterol Sulfate 108 (90 Base) MCG/ACT AEROSOL POWDER, BREATH ACTIVATED Inhale  by mouth.     • buPROPion (WELLBUTRIN) 100 MG Tab Take 100 mg by mouth 2 times a day.     • divalproex (DEPAKOTE) 500 MG Tablet Delayed Response Take 1,000 mg by mouth every bedtime.     • lidocaine (LIDODERM) 5 % Patch Apply 1 Patch to skin as directed 1 time daily as needed.       No current  "facility-administered medications for this visit.         Social History:     Social History   Substance Use Topics   • Smoking status: Former Smoker     Packs/day: 1.00     Years: 13.00     Types: Cigarettes     Quit date: 1/1/2015   • Smokeless tobacco: Former User     Types: Chew   • Alcohol use Yes      Comment: Rarely drinks alcohol. Pt chewed tabacco years ago didn't chew tabacco every day.       ROS:     - NOTE: All other systems reviewed and are negative, except as in HPI.     Objective:   Physical Exam:    Vitals: /80 (BP Location: Left arm, Patient Position: Sitting, BP Cuff Size: Adult long)   Pulse 80   Temp 36.6 °C (97.8 °F) (Temporal)   Ht 1.854 m (6' 0.99\")   Wt 112.6 kg (248 lb 3.8 oz)   SpO2 94%    BMI: Body mass index is 32.76 kg/m².   General/Constitutional: Vitals as above, Well nourished, well developed male in no acute distress   Head/Eyes: Head is grossly normal & atraumatic, bilateral conjunctivae clear and not injected, bilateral EOMI, bilateral PERRL   ENT: Bilateral external ears grossly normal in appearance, Hearing grossly intact, External nares normal in appearance and without discharge/bleeding   Respiratory: No respiratory distress, bilateral lungs are clear to ausculation in all lung fields (anterior/lateral/posterior), no wheezing/rhonchi/rales   Cardiovascular: Regular rate and rhythm without murmur/gallops/rubs, distal pulses are intact and equal bilaterally (radial, posterior tibial), no bilateral lower extremity edema   MSK: Bilateral knees with moderate crepitus on passive range of motion (R>L) without any gross erythemea/edema at present, L shoulder with mild pain on passive ROM but without palpable crepitus, paraspinal muscular tenderness to palpation in cervical and lumbar back, no paresthesias nor numbness on gentle percussion of vertebral processes of entire back, Gait mildly antalgic   Integumentary: No apparent rashes   Psych: Judgment grossly appropriate, no " apparent depression/anxiety    Health Maintenance:     - NarxCheck is appropriate    Imaging/Labs:     - UDS is uptdodate    Assessment and Plan:   1. Chronic use of opiate drug for therapeutic purpose  2. (HCC) Opioid type dependence, continuous use  3. Chronic left shoulder pain  4. Chronic neck and back pain  5. Lateral meniscus derangement, right  6. H/O meniscectomy of right knee -- medial meniscus  7. Bilateral post-traumatic osteoarthritis of knee  Chronic, stable, well-controlled.  Continue taking medication as directed.    - Controlled Substance Treatment Agreement   - oxyCODONE immediate release (ROXICODONE) 10 MG immediate release tablet; Take 1 Tab by mouth every 6 hours as needed for Moderate Pain or Severe Pain for up to 30 days.  Dispense: 105 Tab; Refill: 0    8. Obesity (BMI 30-39.9)  Chronic, uncontrolled, patient advised to pursue lifestyle changes, particularly cardiovascular exercise and increasing proportion of plant-based nutrition.   - Patient identified as having weight management issue.  Appropriate orders and counseling given.        RTC: in 3months for pain management.    PLEASE NOTE: This dictation was created using voice recognition software. I have made every reasonable attempt to correct obvious errors, but I expect that there are errors of grammar and possibly content that I did not discover before finalizing the note.

## 2019-07-25 NOTE — ASSESSMENT & PLAN NOTE
Chronic, stable, well-controlled.  Please see notes from same day service 7/11/2019 regarding chronic use of opiate drugs for therapeutic purposes.

## 2019-07-25 NOTE — ASSESSMENT & PLAN NOTE
Chronic, stable, well-controlled, taking medication as directed.      ROS is NEGATIVE for respiratory depression, cognitive slowing, constipation, cyanotic skin changes.    ROS is NEGATIVE for BLE radicular pain, saddle paresthesias, bowel or bladder incontinence, gait instability

## 2019-10-26 ENCOUNTER — OFFICE VISIT (OUTPATIENT)
Dept: URGENT CARE | Facility: PHYSICIAN GROUP | Age: 34
End: 2019-10-26
Payer: OTHER GOVERNMENT

## 2019-10-26 ENCOUNTER — HOSPITAL ENCOUNTER (OUTPATIENT)
Facility: MEDICAL CENTER | Age: 34
End: 2019-10-26
Attending: FAMILY MEDICINE
Payer: OTHER GOVERNMENT

## 2019-10-26 VITALS
TEMPERATURE: 98.3 F | WEIGHT: 261 LBS | OXYGEN SATURATION: 98 % | HEART RATE: 90 BPM | HEIGHT: 73 IN | DIASTOLIC BLOOD PRESSURE: 86 MMHG | SYSTOLIC BLOOD PRESSURE: 140 MMHG | BODY MASS INDEX: 34.59 KG/M2 | RESPIRATION RATE: 20 BRPM

## 2019-10-26 DIAGNOSIS — A37.90 WHOOPING COUGH: ICD-10-CM

## 2019-10-26 PROCEDURE — 99213 OFFICE O/P EST LOW 20 MIN: CPT | Performed by: FAMILY MEDICINE

## 2019-10-26 PROCEDURE — 87798 DETECT AGENT NOS DNA AMP: CPT

## 2019-10-26 RX ORDER — OXYCODONE HYDROCHLORIDE 5 MG/1
10 TABLET ORAL EVERY 4 HOURS PRN
COMMUNITY
End: 2019-11-05 | Stop reason: SDUPTHER

## 2019-10-26 RX ORDER — BENZONATATE 100 MG/1
100 CAPSULE ORAL 3 TIMES DAILY PRN
Qty: 60 CAP | Refills: 0 | Status: SHIPPED | OUTPATIENT
Start: 2019-10-26 | End: 2019-11-05

## 2019-10-26 RX ORDER — AZITHROMYCIN 250 MG/1
250 TABLET, FILM COATED ORAL DAILY
COMMUNITY
End: 2019-11-05

## 2019-10-26 NOTE — PROGRESS NOTES
Subjective:     Danie Pretty is a 34 y.o. male who presents for Cough (x 7 days, Pt states he was exposed to whooping cough at work on 10/21/19, Pt already being treated just needs to know if he is positive or not)    HPI  Pt presents for evaluation of a new problem   Pt with cough for the past 7 days   Pt is currently on Azithromycin for the past 3 days   Was not tested for whooping cough, but was exposed and was empirically treated  Patient would like to be tested to be sure he has whooping cough so that he can tell his contacts  Patient with a dry cough which is improving the last few days  No new fevers the last few days    Review of Systems   Constitutional: Negative for fever.   HENT: Positive for congestion.    Respiratory: Positive for cough. Negative for shortness of breath.    Cardiovascular: Negative for chest pain.   Gastrointestinal: Negative for abdominal pain, diarrhea, nausea and vomiting.   Skin: Negative for rash.   Neurological: Negative for headaches.       PMH:  has a past medical history of Anesthesia, Arthritis, Bowel habit changes, Pain, and Psychiatric problem.  MEDS:   Current Outpatient Medications:   •  oxyCODONE immediate-release (ROXICODONE) 5 MG Tab, Take 10 mg by mouth every four hours as needed for Severe Pain., Disp: , Rfl:   •  azithromycin (ZITHROMAX) 250 MG Tab, Take 250 mg by mouth every day., Disp: , Rfl:   •  naproxen (NAPROSYN) 500 MG Tab, Take 1 Tab by mouth 2 times a day, with meals., Disp: 180 Tab, Rfl: 0  •  methocarbamol (ROBAXIN) 500 MG Tab, Take 2 Tabs by mouth 4 times a day., Disp: 720 Tab, Rfl: 0  •  pregabalin (LYRICA) 100 MG Cap, Take 100 mg by mouth 2 times a day., Disp: , Rfl:   •  acetaminophen (TYLENOL) 500 MG Tab, Take 500-1,000 mg by mouth every 6 hours as needed., Disp: , Rfl:   •  Albuterol Sulfate 108 (90 Base) MCG/ACT AEROSOL POWDER, BREATH ACTIVATED, Inhale  by mouth., Disp: , Rfl:   •  divalproex (DEPAKOTE) 500 MG Tablet Delayed Response, Take  "1,000 mg by mouth every bedtime., Disp: , Rfl:   •  lidocaine (LIDODERM) 5 % Patch, Apply 1 Patch to skin as directed 1 time daily as needed., Disp: , Rfl:   •  buPROPion (WELLBUTRIN) 100 MG Tab, Take 100 mg by mouth 2 times a day., Disp: , Rfl:   ALLERGIES: No Known Allergies  SURGHX:   Past Surgical History:   Procedure Laterality Date   • CERVICAL DISK AND FUSION ANTERIOR  3/7/2017    Procedure: CERVICAL DISK AND FUSION ANTERIOR;  Surgeon: Timi Cook M.D.;  Location: SURGERY [facility];  Service:    • OTHER  2015    Right knee miniscus.   • OTHER  2007    Left shoulder SLAP tear.   • TONSILLECTOMY       SOCHX:  reports that he quit smoking about 4 years ago. His smoking use included cigarettes. He has a 13.00 pack-year smoking history. He has quit using smokeless tobacco.  His smokeless tobacco use included chew. He reports that he drinks alcohol. He reports that he does not use drugs.  FH: Family history was reviewed, not contributing to acute illness     Objective:   /86 (BP Location: Left arm, Patient Position: Sitting, BP Cuff Size: Adult)   Pulse 90   Temp 36.8 °C (98.3 °F) (Temporal)   Resp 20   Ht 1.854 m (6' 1\")   Wt 118.4 kg (261 lb)   SpO2 98%   BMI 34.43 kg/m²     Physical Exam  Constitutional:       General: He is not in acute distress.     Appearance: He is well-developed. He is not diaphoretic.   HENT:      Head: Normocephalic and atraumatic.      Right Ear: External ear normal.      Left Ear: External ear normal.      Nose: Nose normal.      Mouth/Throat:      Pharynx: No oropharyngeal exudate.   Eyes:      General: No scleral icterus.        Right eye: No discharge.         Left eye: No discharge.      Conjunctiva/sclera: Conjunctivae normal.      Pupils: Pupils are equal, round, and reactive to light.   Neck:      Musculoskeletal: Normal range of motion.      Trachea: No tracheal deviation.   Cardiovascular:      Rate and Rhythm: Normal rate and regular rhythm.      Heart " sounds: Normal heart sounds.   Pulmonary:      Effort: Pulmonary effort is normal. No respiratory distress.      Breath sounds: Normal breath sounds. No wheezing or rales.   Musculoskeletal: Normal range of motion.   Skin:     General: Skin is warm and dry.      Findings: No rash.   Neurological:      Mental Status: He is alert.   Psychiatric:         Behavior: Behavior normal.         Thought Content: Thought content normal.         Judgment: Judgment normal.         Assessment/Plan:   Assessment    1. Whooping cough  - PERTUSSIS PCR; Future    Patient already on treatment, however requests testing to be sure.  Advised that testing may come back falsely negative because he has been on treatment.  Patient still prefers to have test done and swab sent today.

## 2019-10-30 LAB — B PERT DNA SPEC QL NAA+PROBE: NORMAL

## 2019-11-05 ENCOUNTER — OFFICE VISIT (OUTPATIENT)
Dept: MEDICAL GROUP | Facility: MEDICAL CENTER | Age: 34
End: 2019-11-05
Payer: OTHER GOVERNMENT

## 2019-11-05 VITALS
WEIGHT: 251 LBS | OXYGEN SATURATION: 97 % | SYSTOLIC BLOOD PRESSURE: 142 MMHG | DIASTOLIC BLOOD PRESSURE: 76 MMHG | HEIGHT: 73 IN | BODY MASS INDEX: 33.27 KG/M2 | RESPIRATION RATE: 16 BRPM | HEART RATE: 67 BPM

## 2019-11-05 DIAGNOSIS — G89.29 CHRONIC NECK AND BACK PAIN: ICD-10-CM

## 2019-11-05 DIAGNOSIS — M54.9 CHRONIC NECK AND BACK PAIN: ICD-10-CM

## 2019-11-05 DIAGNOSIS — Z79.891 CHRONIC USE OF OPIATE DRUG FOR THERAPEUTIC PURPOSE: ICD-10-CM

## 2019-11-05 DIAGNOSIS — M54.2 CHRONIC NECK AND BACK PAIN: ICD-10-CM

## 2019-11-05 PROCEDURE — 99213 OFFICE O/P EST LOW 20 MIN: CPT | Performed by: NURSE PRACTITIONER

## 2019-11-05 RX ORDER — OXYCODONE HYDROCHLORIDE 5 MG/1
10 TABLET ORAL EVERY 8 HOURS PRN
Qty: 84 TAB | Refills: 0 | Status: SHIPPED | OUTPATIENT
Start: 2019-11-05 | End: 2019-11-19

## 2019-11-05 ASSESSMENT — ENCOUNTER SYMPTOMS
NECK PAIN: 1
BACK PAIN: 1

## 2019-11-05 NOTE — PROGRESS NOTES
"Subjective:      Danie Pretty is a 34 y.o. male who presents with Medication Refill (oxycodone)        CC: Patient is here today mainly for refill on oxycodone which he takes for chronic back pain.  He has been going to  up until recently and has not established with a new PCP.    HPI       1. Chronic neck and back pain  Patient reports long history of chronic back and neck pain secondary to injuries from when he was in the [place].  [facility] have been treating him with epidural injections and other options but they do not provide opiate medication.  He has been receiving oxycodone 5 mg regularly and was taking number 120/month although his last prescription was decreased down to 105/month.  He states he had his PCP had decided to start weaning down on the dose.  He has not been to pain management.    2. Chronic use of opiate drug for therapeutic purpose   shows patient filled his last prescription for oxycodone in July for number 105 tablets.  He has not done any regular opiates otherwise.  Past Medical History:   Diagnosis Date   • Anesthesia     Pt states \"woke up during shoulder surgery both times\".   • Arthritis     Osteo - Left shoulder, R knee.   • Bowel habit changes     Constipation   • Pain     Left shoulder, neck, back and right knee.   • Psychiatric problem     Depression, anxiety, and PTSD.     Social History     Socioeconomic History   • Marital status:      Spouse name: Not on file   • Number of children: Not on file   • Years of education: Not on file   • Highest education level: Not on file   Occupational History   • Not on file   Social Needs   • Financial resource strain: Not on file   • Food insecurity:     Worry: Not on file     Inability: Not on file   • Transportation needs:     Medical: Not on file     Non-medical: Not on file   Tobacco Use   • Smoking status: Former Smoker     Packs/day: 1.00     Years: 13.00     Pack years: 13.00     Types: " Cigarettes     Last attempt to quit: 2015     Years since quittin.8   • Smokeless tobacco: Former User     Types: Chew   Substance and Sexual Activity   • Alcohol use: Yes     Comment: Rarely drinks alcohol. Pt chewed tabacco years ago didn't chew tabacco every day.   • Drug use: No   • Sexual activity: Not on file   Lifestyle   • Physical activity:     Days per week: Not on file     Minutes per session: Not on file   • Stress: Not on file   Relationships   • Social connections:     Talks on phone: Not on file     Gets together: Not on file     Attends Yarsanism service: Not on file     Active member of club or organization: Not on file     Attends meetings of clubs or organizations: Not on file     Relationship status: Not on file   • Intimate partner violence:     Fear of current or ex partner: Not on file     Emotionally abused: Not on file     Physically abused: Not on file     Forced sexual activity: Not on file   Other Topics Concern   • Not on file   Social History Narrative   • Not on file     Current Outpatient Medications   Medication Sig Dispense Refill   • oxyCODONE immediate-release (ROXICODONE) 5 MG Tab Take 2 Tabs by mouth every 8 hours as needed for Severe Pain for up to 14 days. 84 Tab 0   • naproxen (NAPROSYN) 500 MG Tab Take 1 Tab by mouth 2 times a day, with meals. 180 Tab 0   • methocarbamol (ROBAXIN) 500 MG Tab Take 2 Tabs by mouth 4 times a day. 720 Tab 0   • pregabalin (LYRICA) 100 MG Cap Take 100 mg by mouth 2 times a day.     • acetaminophen (TYLENOL) 500 MG Tab Take 500-1,000 mg by mouth every 6 hours as needed.     • Albuterol Sulfate 108 (90 Base) MCG/ACT AEROSOL POWDER, BREATH ACTIVATED Inhale  by mouth.     • buPROPion (WELLBUTRIN) 100 MG Tab Take 100 mg by mouth 2 times a day.     • divalproex (DEPAKOTE) 500 MG Tablet Delayed Response Take 1,000 mg by mouth every bedtime.     • lidocaine (LIDODERM) 5 % Patch Apply 1 Patch to skin as directed 1 time daily as needed.       No  "current facility-administered medications for this visit.      No family history on file.      Review of Systems   Musculoskeletal: Positive for back pain and neck pain.   All other systems reviewed and are negative.         Objective:     /76 (BP Location: Right arm, Patient Position: Sitting, BP Cuff Size: Adult)   Pulse 67   Resp 16   Ht 1.854 m (6' 1\")   Wt 113.9 kg (251 lb)   SpO2 97%   BMI 33.12 kg/m²      Physical Exam  Vitals signs and nursing note reviewed.   Constitutional:       General: He is not in acute distress.     Appearance: He is well-developed. He is not diaphoretic.   HENT:      Head: Normocephalic and atraumatic.      Right Ear: External ear normal.      Left Ear: External ear normal.      Nose: Nose normal.   Eyes:      General:         Right eye: No discharge.         Left eye: No discharge.      Conjunctiva/sclera: Conjunctivae normal.   Neck:      Musculoskeletal: Normal range of motion and neck supple.      Thyroid: No thyromegaly.      Trachea: No tracheal deviation.   Cardiovascular:      Rate and Rhythm: Normal rate and regular rhythm.      Heart sounds: Normal heart sounds. No murmur.   Pulmonary:      Effort: Pulmonary effort is normal. No respiratory distress.      Breath sounds: Normal breath sounds. No wheezing or rales.   Musculoskeletal:      Cervical back: He exhibits decreased range of motion and pain.      Thoracic back: He exhibits decreased range of motion and pain.   Lymphadenopathy:      Cervical: No cervical adenopathy.   Skin:     General: Skin is warm and dry.      Findings: No erythema or rash.   Neurological:      Mental Status: He is alert and oriented to person, place, and time.      Coordination: Coordination normal.   Psychiatric:         Behavior: Behavior normal.         Thought Content: Thought content normal.         Judgment: Judgment normal.                 Assessment/Plan:       1. Chronic neck and back pain  This is a pleasant 34-year-old male " with history of chronic neck and back issues for which she [facility] but is wanting me to take over his oxycodone prescription.  He did do a urine drug screen in April which did not show other controlled substances.  He has signed previous drug contract.    I explained to patient that I do not do chronic pain management but I would be more than willing to refer him to a pain clinic.  I will provide him with a 2 to 3-week supply of oxycodone with the understanding I cannot refill this and that he might want to wean off the medication until he can get into pain management to discuss other options.  - oxyCODONE immediate-release (ROXICODONE) 5 MG Tab; Take 2 Tabs by mouth every 8 hours as needed for Severe Pain for up to 14 days.  Dispense: 84 Tab; Refill: 0  - REFERRAL TO PAIN CLINIC    2. Chronic use of opiate drug for therapeutic purpose  Medication refilled today for short-term until he can find a primary who is willing to prescribe this for pain management which is willing to prescribe opiate therapy.

## 2019-11-14 ASSESSMENT — ENCOUNTER SYMPTOMS
SHORTNESS OF BREATH: 0
NAUSEA: 0
HEADACHES: 0
DIARRHEA: 0
COUGH: 1
FEVER: 0
ABDOMINAL PAIN: 0
VOMITING: 0

## 2019-11-26 ENCOUNTER — TELEPHONE (OUTPATIENT)
Dept: MEDICAL GROUP | Facility: MEDICAL CENTER | Age: 34
End: 2019-11-26
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
## 2022-03-18 ENCOUNTER — HOSPITAL ENCOUNTER (OUTPATIENT)
Facility: MEDICAL CENTER | Age: 37
End: 2022-03-18
Attending: UROLOGY
Payer: OTHER GOVERNMENT

## 2022-03-18 PROCEDURE — 89321 SEMEN ANAL SPERM DETECTION: CPT

## 2022-03-21 LAB
SPECIMEN VOL SMN: 2.1 ML
SPERM P VAS SMN QL MICRO: NORMAL

## 2024-07-24 NOTE — ASSESSMENT & PLAN NOTE
New problem for medical evaluation, uncontrolled, patient with area of erythematous induration overlying his upper portion of the anterior thigh is approximately 3 cm in diameter, and area of indurated skin extending to a total dimension of approximately 10 cm x 8 cm, without area of palpable fluctuance.      Patient had previously tried topical antibiotics, as well as managing the lesion at home.  However, neither of these treatments at home were sufficient to eliminate the infection.  Therefore, I discussed with patient that he has cellulitis, and that this seems to be treated with antibiotics.     [Negative] : Heme/Lymph

## (undated) DEVICE — SYRINGE 30 ML LL (56/BX)

## (undated) DEVICE — DRESSING TRANSPARENT FILM TEGADERM 4 X 4.75" (50EA/BX)"

## (undated) DEVICE — KIT SURGIFLO W/OUT THROMBIN - (6EA/CA)

## (undated) DEVICE — CLOSURE SKIN STRIP 1/2 X 4 IN - (STERI STRIP) (50/BX 4BX/CA)

## (undated) DEVICE — LACTATED RINGERS INJ. 500 ML - (24EA/CA)

## (undated) DEVICE — NEPTUNE 4 PORT MANIFOLD - (20/PK)

## (undated) DEVICE — MIDAS LUBRICATOR DIFFUSER PACK (4EA/CA)

## (undated) DEVICE — GOWN WARMING STANDARD FLEX - (30/CA)

## (undated) DEVICE — SUTURE 3-0 MONOCRYL PLUS PS-1 - 27 INCH (36/BX)

## (undated) DEVICE — TOOL DISSECT MATCH HEAD

## (undated) DEVICE — GLOVE BIOGEL INDICATOR SZ 7.5 SURGICAL PF LTX - (50PR/BX 4BX/CA)

## (undated) DEVICE — TRAY SKIN SCRUB PVP WET (20EA/CA) PART #DYND70356 DISCONTINUED

## (undated) DEVICE — RESTRAINTS LIMB DISP. - (12/BX 4BX/CA)

## (undated) DEVICE — CLIP SM INTNL HRZN TI ESCP LGT - (24EA/PK 25PK/BX)

## (undated) DEVICE — HEAD HOLDER JUNIOR/ADULT

## (undated) DEVICE — DRAPE MICROSCOPE X-LONG (10EA/CA)

## (undated) DEVICE — SHEET PEDIATRIC LAPAROTOMY - (10/CA)

## (undated) DEVICE — PROTECTOR ULNA NERVE - (36PR/CA)

## (undated) DEVICE — SET LEADWIRE 5 LEAD BEDSIDE DISPOSABLE ECG (1SET OF 5/EA)

## (undated) DEVICE — LIGHT SOURCE MIS 12FT

## (undated) DEVICE — KIT ANESTHESIA W/CIRCUIT & 3/LT BAG W/FILTER (20EA/CA)

## (undated) DEVICE — ELECTRODE DUAL RETURN W/ CORD - (50/PK)

## (undated) DEVICE — CLIP MED INTNL HRZN TI ESCP - (25/BX)

## (undated) DEVICE — LEAD SET 6 DISP. EKG NIHON KOHDEN (100EA/CA)

## (undated) DEVICE — SET EXTENSION WITH 2 PORTS (48EA/CA) ***PART #2C8610 IS A SUBSTITUTE*****

## (undated) DEVICE — TUBING CLEARLINK DUO-VENT - C-FLO (48EA/CA)

## (undated) DEVICE — BOVIE BLADE COATED &INSULATED - 25/PK

## (undated) DEVICE — NEEDLE SPINAL NON-SAFETY 18 GA X 3 IN (25EA/BX)

## (undated) DEVICE — GOWN SURGICAL XX-LARGE - (28EA/CA) SIRUS NON REINFORCED

## (undated) DEVICE — Device

## (undated) DEVICE — SUCTION INSTRUMENT YANKAUER BULBOUS TIP W/O VENT (50EA/CA)

## (undated) DEVICE — SPONGE GAUZESTER 4 X 4 4PLY - (128PK/CA)

## (undated) DEVICE — NEEDLE NON SAFETY HYPO 22 GA X 1 1/2 IN (100/BX)

## (undated) DEVICE — SENSOR SPO2 NEO LNCS ADHESIVE (20/BX) SEE USER NOTES

## (undated) DEVICE — GLOVE BIOGEL INDICATOR SZ 7SURGICAL PF LTX - (50/BX 4BX/CA)

## (undated) DEVICE — SUTURE 3-0 VICRYL PLUS SH - 27 INCH (36/BX)

## (undated) DEVICE — LACTATED RINGERS INJ 1000 ML - (14EA/CA 60CA/PF)

## (undated) DEVICE — SCREW DISTRACTION 14MM YELLOW - STERILE (10EA/BX) (5TX4=20)

## (undated) DEVICE — SLEEVE, VASO, THIGH, MED

## (undated) DEVICE — SYRINGE SAFETY 10 ML 18 GA X 1 1/2 BLUNT LL (100/BX 4BX/CA)

## (undated) DEVICE — COLLAR CERVICAL 3 IN LOW CONTOUR

## (undated) DEVICE — PACK NEURO - (2EA/CA)

## (undated) DEVICE — MASK ANESTHESIA ADULT  - (100/CA)

## (undated) DEVICE — GLOVE BIOGEL SZ 7 SURGICAL PF LTX - (50PR/BX 4BX/CA)

## (undated) DEVICE — CANISTER SUCTION 3000ML MECHANICAL FILTER AUTO SHUTOFF MEDI-VAC NONSTERILE LF DISP  (40EA/CA)

## (undated) DEVICE — SUTURE GENERAL

## (undated) DEVICE — KIT ROOM DECONTAMINATION

## (undated) DEVICE — SODIUM CHL IRRIGATION 0.9% 1000ML (12EA/CA)

## (undated) DEVICE — GLOVE BIOGEL SZ 6.5 SURGICAL PF LTX (50PR/BX 4BX/CA)

## (undated) DEVICE — ELECTRODE 850 FOAM ADHESIVE - HYDROGEL RADIOTRNSPRNT (50/PK)

## (undated) DEVICE — DISSECT TOOL MIDAS REX

## (undated) DEVICE — DRILL BIT 12MM

## (undated) DEVICE — SUTURE 3-0 VICRYL PLUS RB-1 - 8 X 18 INCH (12/BX)